# Patient Record
Sex: FEMALE | Race: WHITE | NOT HISPANIC OR LATINO | URBAN - METROPOLITAN AREA
[De-identification: names, ages, dates, MRNs, and addresses within clinical notes are randomized per-mention and may not be internally consistent; named-entity substitution may affect disease eponyms.]

---

## 2021-12-09 ENCOUNTER — PREPPED CHART (OUTPATIENT)
Dept: URBAN - METROPOLITAN AREA CLINIC 64 | Facility: CLINIC | Age: 66
End: 2021-12-09

## 2021-12-09 PROBLEM — H35.81 RETINAL EDEMA: Status: ACTIVE | Noted: 2021-12-09

## 2021-12-09 PROBLEM — H43.812 POSTERIOR VITREOUS DETACHMENT: Noted: 2021-12-09

## 2021-12-09 PROBLEM — H35.81 RETINAL EDEMA: Noted: 2021-12-09

## 2021-12-09 PROBLEM — H35.371 EPIRETINAL MEMBRANE: Noted: 2021-12-09 | Resolved: 2021-12-09

## 2021-12-09 PROBLEM — H35.81 RETINAL EDEMA: Status: RECURRENCE | Noted: 2021-12-09

## 2021-12-09 PROBLEM — H40.021 GLAUCOMA SUSPECT, HIGH RISK: Status: STABILIZING | Noted: 2021-12-09

## 2021-12-09 PROBLEM — H35.371 EPIRETINAL MEMBRANE: Noted: 2021-12-09

## 2021-12-09 PROBLEM — H35.81 RETINAL EDEMA: Status: RESOLVED | Noted: 2021-12-09 | Resolved: 2021-12-09

## 2021-12-09 PROBLEM — H35.81 RETINAL EDEMA: Status: RECURRENCE | Noted: 2021-12-09 | Resolved: 2021-12-09

## 2021-12-09 PROBLEM — H40.021 GLAUCOMA SUSPECT, HIGH RISK: Noted: 2021-12-09

## 2021-12-09 PROBLEM — H40.021 GLAUCOMA SUSPECT, HIGH RISK: Noted: 2021-12-09 | Resolved: 2021-12-09

## 2021-12-09 PROBLEM — H35.371 EPIRETINAL MEMBRANE: Status: STABILIZING | Noted: 2021-12-09

## 2021-12-09 PROBLEM — H43.812 POSTERIOR VITREOUS DETACHMENT: Noted: 2021-12-09 | Resolved: 2021-12-09

## 2021-12-09 PROBLEM — Z86.69 PERSONAL HISTORY DISORDERS OF NERVOUS SYSTEM AND SENSORY ORGANS: Noted: 2021-12-09

## 2021-12-09 PROBLEM — H35.81 RETINAL EDEMA: Status: DETERIORATING | Noted: 2021-12-09

## 2021-12-09 PROBLEM — H35.81 RETINAL EDEMA: Status: IMPROVING | Noted: 2021-12-09

## 2022-02-21 ENCOUNTER — HOSPITAL ENCOUNTER (INPATIENT)
Facility: HOSPITAL | Age: 67
LOS: 3 days | Discharge: HOME | DRG: 065 | End: 2022-02-24
Attending: EMERGENCY MEDICINE | Admitting: INTERNAL MEDICINE
Payer: MEDICARE

## 2022-02-21 DIAGNOSIS — I63.9 STROKE: ICD-10-CM

## 2022-02-21 DIAGNOSIS — I48.91 NEW ONSET A-FIB: ICD-10-CM

## 2022-02-21 DIAGNOSIS — I48.92 ATRIAL FIBRILLATION AND FLUTTER: ICD-10-CM

## 2022-02-21 DIAGNOSIS — I48.91 ATRIAL FIBRILLATION AND FLUTTER: ICD-10-CM

## 2022-02-21 DIAGNOSIS — I63.9 CEREBROVASCULAR ACCIDENT (CVA), UNSPECIFIED MECHANISM: Primary | ICD-10-CM

## 2022-02-21 PROBLEM — I63.311 THROMBOTIC STROKE INVOLVING RIGHT MIDDLE CEREBRAL ARTERY: Status: ACTIVE | Noted: 2022-02-21

## 2022-02-21 LAB
ALBUMIN SERPL BCP-MCNC: 3.7 G/DL (ref 3.5–5.2)
ALP SERPL-CCNC: 81 U/L (ref 55–135)
ALT SERPL W/O P-5'-P-CCNC: 24 U/L (ref 10–44)
ANION GAP SERPL CALC-SCNC: 10 MMOL/L (ref 8–16)
AST SERPL-CCNC: 21 U/L (ref 10–40)
BASOPHILS # BLD AUTO: 0.07 K/UL (ref 0–0.2)
BASOPHILS NFR BLD: 0.7 % (ref 0–1.9)
BILIRUB SERPL-MCNC: 0.5 MG/DL (ref 0.1–1)
BUN SERPL-MCNC: 21 MG/DL (ref 8–23)
CALCIUM SERPL-MCNC: 10.8 MG/DL (ref 8.7–10.5)
CHLORIDE SERPL-SCNC: 105 MMOL/L (ref 95–110)
CHOLEST SERPL-MCNC: 182 MG/DL (ref 120–199)
CHOLEST/HDLC SERPL: 4.7 {RATIO} (ref 2–5)
CO2 SERPL-SCNC: 23 MMOL/L (ref 23–29)
CREAT SERPL-MCNC: 0.9 MG/DL (ref 0.5–1.4)
DIFFERENTIAL METHOD: ABNORMAL
EOSINOPHIL # BLD AUTO: 0.3 K/UL (ref 0–0.5)
EOSINOPHIL NFR BLD: 3.4 % (ref 0–8)
ERYTHROCYTE [DISTWIDTH] IN BLOOD BY AUTOMATED COUNT: 12.7 % (ref 11.5–14.5)
EST. GFR  (AFRICAN AMERICAN): >60 ML/MIN/1.73 M^2
EST. GFR  (NON AFRICAN AMERICAN): >60 ML/MIN/1.73 M^2
GLUCOSE SERPL-MCNC: 111 MG/DL (ref 70–110)
HCT VFR BLD AUTO: 42.3 % (ref 37–48.5)
HDLC SERPL-MCNC: 39 MG/DL (ref 40–75)
HDLC SERPL: 21.4 % (ref 20–50)
HGB BLD-MCNC: 13.4 G/DL (ref 12–16)
IMM GRANULOCYTES # BLD AUTO: 0.04 K/UL (ref 0–0.04)
IMM GRANULOCYTES NFR BLD AUTO: 0.4 % (ref 0–0.5)
INR PPP: 0.9 (ref 0.8–1.2)
LDLC SERPL CALC-MCNC: 94.6 MG/DL (ref 63–159)
LYMPHOCYTES # BLD AUTO: 2.1 K/UL (ref 1–4.8)
LYMPHOCYTES NFR BLD: 21.6 % (ref 18–48)
MCH RBC QN AUTO: 29 PG (ref 27–31)
MCHC RBC AUTO-ENTMCNC: 31.7 G/DL (ref 32–36)
MCV RBC AUTO: 92 FL (ref 82–98)
MONOCYTES # BLD AUTO: 0.6 K/UL (ref 0.3–1)
MONOCYTES NFR BLD: 6.3 % (ref 4–15)
NEUTROPHILS # BLD AUTO: 6.5 K/UL (ref 1.8–7.7)
NEUTROPHILS NFR BLD: 67.6 % (ref 38–73)
NONHDLC SERPL-MCNC: 143 MG/DL
NRBC BLD-RTO: 0 /100 WBC
PLATELET # BLD AUTO: 264 K/UL (ref 150–450)
PMV BLD AUTO: 10.2 FL (ref 9.2–12.9)
POC PTINR: 1.2 (ref 0.9–1.2)
POC PTWBT: 13.9 SEC (ref 9.7–14.3)
POCT GLUCOSE: 117 MG/DL (ref 70–110)
POTASSIUM SERPL-SCNC: 4 MMOL/L (ref 3.5–5.1)
PROT SERPL-MCNC: 7.4 G/DL (ref 6–8.4)
PROTHROMBIN TIME: 9.9 SEC (ref 9–12.5)
RBC # BLD AUTO: 4.62 M/UL (ref 4–5.4)
SAMPLE: NORMAL
SARS-COV-2 RDRP RESP QL NAA+PROBE: NEGATIVE
SODIUM SERPL-SCNC: 138 MMOL/L (ref 136–145)
TRIGL SERPL-MCNC: 242 MG/DL (ref 30–150)
TSH SERPL DL<=0.005 MIU/L-ACNC: 1.72 UIU/ML (ref 0.4–4)
WBC # BLD AUTO: 9.57 K/UL (ref 3.9–12.7)

## 2022-02-21 PROCEDURE — 85025 COMPLETE CBC W/AUTO DIFF WBC: CPT | Performed by: EMERGENCY MEDICINE

## 2022-02-21 PROCEDURE — 25000003 PHARM REV CODE 250: Performed by: INTERNAL MEDICINE

## 2022-02-21 PROCEDURE — 80053 COMPREHEN METABOLIC PANEL: CPT | Performed by: EMERGENCY MEDICINE

## 2022-02-21 PROCEDURE — 93005 ELECTROCARDIOGRAM TRACING: CPT

## 2022-02-21 PROCEDURE — 93010 ELECTROCARDIOGRAM REPORT: CPT | Mod: ,,, | Performed by: SPECIALIST

## 2022-02-21 PROCEDURE — 63600175 PHARM REV CODE 636 W HCPCS: Performed by: INTERNAL MEDICINE

## 2022-02-21 PROCEDURE — 85610 PROTHROMBIN TIME: CPT | Performed by: EMERGENCY MEDICINE

## 2022-02-21 PROCEDURE — G0508 CRIT CARE TELEHEA CONSULT 60: HCPCS | Mod: 95,G0,, | Performed by: PSYCHIATRY & NEUROLOGY

## 2022-02-21 PROCEDURE — 63600175 PHARM REV CODE 636 W HCPCS: Performed by: NURSE PRACTITIONER

## 2022-02-21 PROCEDURE — 84443 ASSAY THYROID STIM HORMONE: CPT | Performed by: EMERGENCY MEDICINE

## 2022-02-21 PROCEDURE — 25000003 PHARM REV CODE 250: Performed by: NURSE PRACTITIONER

## 2022-02-21 PROCEDURE — 25000003 PHARM REV CODE 250: Performed by: EMERGENCY MEDICINE

## 2022-02-21 PROCEDURE — 93010 ELECTROCARDIOGRAM REPORT: CPT | Mod: 76,,, | Performed by: SPECIALIST

## 2022-02-21 PROCEDURE — 80061 LIPID PANEL: CPT | Performed by: EMERGENCY MEDICINE

## 2022-02-21 PROCEDURE — G0508 PR CRITICAL CARE TELEHLTH INITIAL CONSULT 60MIN: ICD-10-PCS | Mod: 95,G0,, | Performed by: PSYCHIATRY & NEUROLOGY

## 2022-02-21 PROCEDURE — 12000002 HC ACUTE/MED SURGE SEMI-PRIVATE ROOM

## 2022-02-21 PROCEDURE — 36415 COLL VENOUS BLD VENIPUNCTURE: CPT | Performed by: EMERGENCY MEDICINE

## 2022-02-21 PROCEDURE — 99291 CRITICAL CARE FIRST HOUR: CPT | Mod: 25

## 2022-02-21 PROCEDURE — U0002 COVID-19 LAB TEST NON-CDC: HCPCS | Performed by: INTERNAL MEDICINE

## 2022-02-21 PROCEDURE — 93010 EKG 12-LEAD: ICD-10-PCS | Mod: ,,, | Performed by: SPECIALIST

## 2022-02-21 PROCEDURE — 85610 PROTHROMBIN TIME: CPT

## 2022-02-21 PROCEDURE — 25500020 PHARM REV CODE 255: Performed by: EMERGENCY MEDICINE

## 2022-02-21 RX ORDER — CLOPIDOGREL BISULFATE 75 MG/1
75 TABLET ORAL DAILY
Status: DISCONTINUED | OUTPATIENT
Start: 2022-02-21 | End: 2022-02-22

## 2022-02-21 RX ORDER — ATORVASTATIN CALCIUM 40 MG/1
80 TABLET, FILM COATED ORAL DAILY
Status: DISCONTINUED | OUTPATIENT
Start: 2022-02-21 | End: 2022-02-24 | Stop reason: HOSPADM

## 2022-02-21 RX ORDER — ONDANSETRON 2 MG/ML
4 INJECTION INTRAMUSCULAR; INTRAVENOUS EVERY 6 HOURS PRN
Status: DISCONTINUED | OUTPATIENT
Start: 2022-02-21 | End: 2022-02-24 | Stop reason: HOSPADM

## 2022-02-21 RX ORDER — DIPHENHYDRAMINE HCL 25 MG
25 CAPSULE ORAL EVERY 6 HOURS PRN
Status: DISCONTINUED | OUTPATIENT
Start: 2022-02-21 | End: 2022-02-21

## 2022-02-21 RX ORDER — KETOROLAC TROMETHAMINE 5 MG/ML
1 SOLUTION OPHTHALMIC DAILY
COMMUNITY

## 2022-02-21 RX ORDER — ENOXAPARIN SODIUM 100 MG/ML
40 INJECTION SUBCUTANEOUS EVERY 24 HOURS
Status: DISCONTINUED | OUTPATIENT
Start: 2022-02-21 | End: 2022-02-22

## 2022-02-21 RX ORDER — METHYLPREDNISOLONE SOD SUCC 125 MG
125 VIAL (EA) INJECTION ONCE
Status: COMPLETED | OUTPATIENT
Start: 2022-02-21 | End: 2022-02-21

## 2022-02-21 RX ORDER — FAMOTIDINE 10 MG/ML
20 INJECTION INTRAVENOUS ONCE
Status: COMPLETED | OUTPATIENT
Start: 2022-02-21 | End: 2022-02-21

## 2022-02-21 RX ORDER — DILTIAZEM HYDROCHLORIDE 5 MG/ML
10 INJECTION INTRAVENOUS ONCE
Status: COMPLETED | OUTPATIENT
Start: 2022-02-21 | End: 2022-02-21

## 2022-02-21 RX ORDER — ASPIRIN 325 MG
325 TABLET ORAL ONCE
Status: COMPLETED | OUTPATIENT
Start: 2022-02-21 | End: 2022-02-21

## 2022-02-21 RX ORDER — AMLODIPINE BESYLATE 2.5 MG/1
2.5 TABLET ORAL DAILY
COMMUNITY
End: 2022-02-24

## 2022-02-21 RX ORDER — ASPIRIN 325 MG
325 TABLET ORAL DAILY
Status: DISCONTINUED | OUTPATIENT
Start: 2022-02-21 | End: 2022-02-21

## 2022-02-21 RX ORDER — LABETALOL HYDROCHLORIDE 5 MG/ML
10 INJECTION, SOLUTION INTRAVENOUS EVERY 4 HOURS PRN
Status: DISCONTINUED | OUTPATIENT
Start: 2022-02-21 | End: 2022-02-24 | Stop reason: HOSPADM

## 2022-02-21 RX ORDER — BRIMONIDINE TARTRATE AND TIMOLOL MALEATE 2; 5 MG/ML; MG/ML
1 SOLUTION OPHTHALMIC DAILY
COMMUNITY

## 2022-02-21 RX ORDER — ACETAMINOPHEN 325 MG/1
650 TABLET ORAL EVERY 4 HOURS PRN
Status: DISCONTINUED | OUTPATIENT
Start: 2022-02-21 | End: 2022-02-24 | Stop reason: HOSPADM

## 2022-02-21 RX ORDER — SODIUM CHLORIDE 0.9 % (FLUSH) 0.9 %
10 SYRINGE (ML) INJECTION
Status: DISCONTINUED | OUTPATIENT
Start: 2022-02-21 | End: 2022-02-24 | Stop reason: HOSPADM

## 2022-02-21 RX ORDER — ATORVASTATIN CALCIUM 40 MG/1
40 TABLET, FILM COATED ORAL DAILY
Status: DISCONTINUED | OUTPATIENT
Start: 2022-02-21 | End: 2022-02-21

## 2022-02-21 RX ORDER — ASPIRIN 81 MG/1
81 TABLET ORAL DAILY
Status: DISCONTINUED | OUTPATIENT
Start: 2022-02-22 | End: 2022-02-22

## 2022-02-21 RX ORDER — DIPHENHYDRAMINE HYDROCHLORIDE 50 MG/ML
25 INJECTION INTRAMUSCULAR; INTRAVENOUS ONCE
Status: COMPLETED | OUTPATIENT
Start: 2022-02-21 | End: 2022-02-21

## 2022-02-21 RX ORDER — CETIRIZINE HYDROCHLORIDE 10 MG/1
10 TABLET ORAL DAILY
COMMUNITY

## 2022-02-21 RX ADMIN — DILTIAZEM HYDROCHLORIDE 5 MG/HR: 5 INJECTION INTRAVENOUS at 10:02

## 2022-02-21 RX ADMIN — ATORVASTATIN CALCIUM 80 MG: 40 TABLET, FILM COATED ORAL at 12:02

## 2022-02-21 RX ADMIN — CLOPIDOGREL 75 MG: 75 TABLET, FILM COATED ORAL at 12:02

## 2022-02-21 RX ADMIN — ASPIRIN 325 MG ORAL TABLET 325 MG: 325 PILL ORAL at 12:02

## 2022-02-21 RX ADMIN — ENOXAPARIN SODIUM 40 MG: 40 INJECTION SUBCUTANEOUS at 06:02

## 2022-02-21 RX ADMIN — FAMOTIDINE 20 MG: 10 INJECTION, SOLUTION INTRAVENOUS at 10:02

## 2022-02-21 RX ADMIN — DIPHENHYDRAMINE HYDROCHLORIDE 25 MG: 50 INJECTION INTRAMUSCULAR; INTRAVENOUS at 10:02

## 2022-02-21 RX ADMIN — METHYLPREDNISOLONE SODIUM SUCCINATE 125 MG: 125 INJECTION, POWDER, FOR SOLUTION INTRAMUSCULAR; INTRAVENOUS at 10:02

## 2022-02-21 RX ADMIN — DIPHENHYDRAMINE HYDROCHLORIDE 25 MG: 25 CAPSULE ORAL at 06:02

## 2022-02-21 RX ADMIN — IOHEXOL 75 ML: 350 INJECTION, SOLUTION INTRAVENOUS at 09:02

## 2022-02-21 RX ADMIN — DILTIAZEM HYDROCHLORIDE 10 MG: 5 INJECTION INTRAVENOUS at 10:02

## 2022-02-21 RX ADMIN — ACETAMINOPHEN 650 MG: 325 TABLET ORAL at 06:02

## 2022-02-21 NOTE — HPI
65 yo female with L weakness and associated face droop and slurred speech.  No clear triggers.  Has not had in past.  Has not improved.  Has not been treated.

## 2022-02-21 NOTE — SUBJECTIVE & OBJECTIVE
"  Woke up with symptoms?: yes    Recent bleeding noted: no  Does the patient take any Blood Thinners? no  Medications: No relevant medications      Past Medical History: hypertension    Past Surgical History: none    Family History: no relevant history    Social History: no smoking, no drinking, no drugs    Allergies: No Known Allergies     Review of Systems   Constitutional: Negative for chills and fever.   HENT: Negative for nosebleeds and sore throat.    Eyes: Negative for photophobia, pain and redness.   Respiratory: Negative for cough and shortness of breath.    Cardiovascular: Negative for chest pain and palpitations.   Gastrointestinal: Negative for abdominal pain, blood in stool, diarrhea and nausea.   Endocrine: Negative for cold intolerance and heat intolerance.   Genitourinary: Negative for difficulty urinating and hematuria.   Musculoskeletal: Negative for arthralgias, back pain and neck pain.   Skin: Negative for color change and rash.   Neurological: Negative for light-headedness and headaches.   Hematological: Does not bruise/bleed easily.   Psychiatric/Behavioral: Negative for confusion and hallucinations.     Objective:   Vitals: Blood pressure (!) 198/106, pulse 90, temperature 97.8 °F (36.6 °C), temperature source Oral, resp. rate 18, height 5' 4" (1.626 m), weight 102.1 kg (225 lb).     CT READ: Yes  No hemmorhage. No mass effect. No early infarct signs.     Physical Exam  Vitals reviewed.   Constitutional:       Appearance: She is well-developed.   HENT:      Head: Normocephalic and atraumatic.      Right Ear: External ear normal.      Left Ear: External ear normal.   Eyes:      Conjunctiva/sclera: Conjunctivae normal.   Neck:      Trachea: No tracheal deviation.   Pulmonary:      Effort: Pulmonary effort is normal. No respiratory distress.   Abdominal:      General: There is no distension.   Musculoskeletal:         General: Normal range of motion.      Cervical back: Normal range of motion. "   Skin:     General: Skin is dry.   Neurological:      Mental Status: She is alert.   Psychiatric:         Behavior: Behavior normal.         Thought Content: Thought content normal.         Judgment: Judgment normal.

## 2022-02-21 NOTE — ED NOTES
0805- ED MD at bedside perfoming Assessment.Aaron Gross    0813- Transported to CT    0825- In CT; running POC Creatinine;     0832- MD in CT room reports put CTA on hold; POC creatinine not working properly.Aaron Gross    0856- Telemed Neurologist MD reports CT can be ran despite labs pending; CT notified; and informed of transport for stat scan.Aaron Gross

## 2022-02-21 NOTE — CONSULTS
[facility]  Neurology  Consult Note        PATIENT NAME: Tameka Hoffman  MRN: [id number]  CSN: [id number]      TODAY'S DATE: 02/21/2022  ADMIT DATE: 2/21/2022                            CONSULTING PROVIDER: Lane Brock MD  CONSULT REQUESTED BY: Bryan Marquez MD      Reason for consult: CVA       History obtained from chart review and the patient.    HPI: Tameka Hoffman is a 66 y.o. female with a history of hypertension, presents to the ER for left-sided weakness, gait imbalance which she noticed when she woke up this morning.    Patient states that she went to bed around 12:00 a.m. and she was normal at that time.  Woke up around 5:30 a.m. and noticed that she was weak on left upper and lower extremities and was not able to walk.  She presented to the ER for these symptoms.  In the ER, she had a tele stroke evaluation and had NIH stroke scale was 6. CT head showed early ischemic changes in the right MCA territory.  She was not a candidate for tPA as she was outside treatment window.  CT angiogram head and neck showed a filling defect in the distal right M2 branch.  Patient was not a candidate for intervention per tele stroke due to her distal occlusion.    I have seen the patient in the ER.  On exam, she had a left facial droop, she had a chronic right eye ptosis and mild left-sided weakness.  NIH stroke scale was 3 on my exam.    Patient is being admitted for further stroke workup and management.    She denies any history of stroke in the past, she is compliant with antihypertensive medications.  She is not on aspirin, Plavix or any anticoagulation at home.  She is not a smoker.      PREVIOUS MEDICAL HISTORY:  No past medical history on file.  PREVIOUS SURGICAL HISTORY:  No past surgical history on file.  FAMILY MEDICAL HISTORY:  No family history on file.  SOCIAL HISTORY:     ALLERGIES:  Review of patient's allergies indicates:  No Known Allergies  HOME MEDICATIONS:  Prior to  "Admission medications    Not on File     CURRENT SCHEDULED MEDICATIONS:   [START ON 2/22/2022] aspirin  81 mg Oral Daily    atorvastatin  80 mg Oral Daily    clopidogreL  75 mg Oral Daily    enoxaparin  40 mg Subcutaneous Daily     CURRENT INFUSIONS:    CURRENT PRN MEDICATIONS:  acetaminophen, labetalol, ondansetron, sodium chloride 0.9%    REVIEW OF SYSTEMS:  Please refer to the HPI for all pertinent positive and negative findings. A comprehensive review of all other systems was negative.       PHYSICAL EXAM:  Patient Vitals for the past 24 hrs:   BP Temp Temp src Pulse Resp SpO2 Height Weight   02/21/22 1015 (!) 147/78 -- -- 84 18 96 % -- --   02/21/22 1000 (!) 148/70 -- -- 75 19 95 % -- --   02/21/22 0945 (!) 169/87 -- -- 83 18 96 % -- --   02/21/22 0930 (!) 167/79 -- -- 80 18 97 % -- --   02/21/22 0915 (!) 173/83 -- -- 86 18 97 % -- --   02/21/22 0900 (!) 171/82 -- -- 81 17 97 % -- --   02/21/22 0845 (!) 162/79 -- -- 77 19 95 % -- --   02/21/22 0830 (!) 172/64 -- -- 89 19 98 % -- --   02/21/22 0815 (!) 162/78 -- -- 86 18 97 % -- --   02/21/22 0803 (!) 198/106 97.8 °F (36.6 °C) Oral 90 18 -- 5' 4" (1.626 m) 102.1 kg (225 lb)       GENERAL APPEARANCE: Alert, well-developed, well-nourished female in no acute distress.  HEENT: Normocephalic and atraumatic. PERRL. Oropharynx unremarkable.  PULM: Normal respiratory effort. No accessory muscle use.  CV: RRR.  ABDOMEN: Soft, nontender.  EXTREMITIES: No obvious signs of vascular compromise. Pulses present. No cyanosis, clubbing or edema.  SKIN: Clear; no rashes, lesions or skin breaks in exposed areas.    NEURO:  MENTAL STATUS: Patient awake and oriented to time, place, and person, recent/remote memory normal, attention span/concentration normal, speech fluent without paraphasic errors, good comprehension with appropriate thought content and fund of knowledge appropriate for patient's level of education.  Affect euthymic.    CRANIAL NERVES:  CN I: Not tested.  CN II: " Fundoscopic exam deferred.  CN III, IV, VI: Pupils equal, round and reactive to light.  Extraocular movements full and intact.  Right ptosis (chronic)  CN V: Facial sensation normal.  CN VII: Facial asymmetry noted for left facial droop.  CN VIII: Hearing grossly normal and equal bilaterally.  No skew deviation or pathologic nystagmus.  CN IX, X: Palate elevates symmetrically. Speech/articulation is clear without dysarthria.  CN XI: Shoulder shrug and chin rotation equal with good strength.  CN XII: Tongue protrusion midline.    MOTOR:  Bulk normal. Tone normal and symmetric throughout.  Abnormal movements absent.  Tremor: none present.  Strength 5/5 in right upper and lower extremity, 4/5 in left upper and lower extremity..    REFLEXES:  DTRs 2+ throughout.  Plantar response downgoing bilaterally.  SENSATION:grossly intact throughout.  COORDINATION: normal finger-to-nose.  STATION: not tested.  GAIT: not tested.      NIHSS:  1a      Level of Consciousness (alert, drowsy, etc.):   0=alert; keenly responsive  1b.     Level of Consciousness Questions (month, age): 0= able to answer both questions  1c.     Level of Consciousness Commands (open, close eyes, make fist, let go):  0=Answers both tasks correctly  2.      Best Gaze (eyes open - patient follows examiner's finger or face):      0=normal  3.      Visual (introduce visual stimulus/threat to patient's visual field quadrants):  0=No visual loss  4.      Facial Palsy (show teeth, raise eyebrows and squeeze eyes shut):        2=Partial paralysis (total or near total paralysis of the lower face)  5a.     Motor Arm - Left (elevate extremity 90 degrees and score drift/movement):       1=Drift, limb holds 90 (or 45) degrees but drifts down before full 10 seconds: does not hit bed  5b.     Motor Arm - Right (elevate extremity 90 degrees and score drift/movement):      0=No drift, limb holds 90 (or 45) degrees for full 10 seconds  6a.     Motor Leg - Left (elevate  extremity 30 degrees and score drift/movement):       0=No drift, limb holds 90 (or 45) degrees for full 10 seconds  6b      Motor Leg - Right (elevate extremity 30 degrees and score drift/movement):      0=No drift, limb holds 90 (or 45) degrees for full 10 seconds  7.      Limb Ataxia (finger-nose, heel down shin):      0=Absent  8.      Sensory (pin prick to face, arm, trunk, and leg - compare side to side):        0=Normal; no sensory loss  9.      Best Language (name items, describe a picture and read sentences):      0=No aphasia, normal  10.     Dysarthria (evaluate speech clarity by patient repeating listed words): 0=Normal  11.     Extinction and Inattention (use prior testing to identify neglect or double simultaneous stimuli testing):      0=No abnormality          NIH Stroke Scale Total:         3      Labs:  Recent Labs   Lab 02/21/22  0853      K 4.0      CO2 23   BUN 21   CREATININE 0.9   *   CALCIUM 10.8*     Recent Labs   Lab 02/21/22  0853   WBC 9.57   HGB 13.4   HCT 42.3        Recent Labs   Lab 02/21/22  0853   ALBUMIN 3.7   PROT 7.4   BILITOT 0.5   ALKPHOS 81   ALT 24   AST 21     Lab Results   Component Value Date    INR 0.9 02/21/2022     Lab Results   Component Value Date    TRIG 242 (H) 02/21/2022    HDL 39 (L) 02/21/2022    CHOLHDL 21.4 02/21/2022     No results found for: HGBA1C  No results found for: PROTEINCSF, GLUCCSF, WBCCSF    Imaging:  I have reviewed and interpreted the pertinent imaging and lab results.      US Carotid Bilateral  Narrative: EXAMINATION:  US CAROTID BILATERAL    CLINICAL HISTORY:  stroke;    TECHNIQUE:  Grayscale and color Doppler ultrasound examination of the carotid and vertebral artery systems bilaterally.  Stenosis estimates are per the NASCET measurement criteria.    COMPARISON:  None.    FINDINGS:  Right:    Internal Carotid Artery (ICA) peak systolic velocity 114 cm/sec    ICA/CCA peak systolic velocity ratio: 1.5    Plaque  formation: Homogeneous    Vertebral artery: Antegrade flow and normal waveform.    Left:    Internal Carotid Artery (ICA)  peak systolic velocity 85 cm/sec    ICA/CCA peak systolic velocity ratio: 1.1    Plaque formation: Homogeneous    Vertebral artery: Antegrade flow and normal waveform.  Impression: No evidence of a hemodynamically significant carotid bifurcation stenosis.    Electronically signed by: Leonard Young  Date:    02/21/2022  Time:    11:15  CTA Head and Neck (xpd)  Narrative: EXAMINATION:  CTA HEAD AND NECK (XPD)    CLINICAL HISTORY:  Neuro deficit, acute, stroke suspected;    TECHNIQUE:  CT angiogram was performed from the level of the ritu to the top of the head following the IV administration of 75mL of Omnipaque 350.   Sagittal and coronal reconstructions and maximum intensity projection reconstructions were performed. Arterial stenosis percentages are based on NASCET measurement criteria.    COMPARISON:  Same-day head CT    FINDINGS:  Subtle loss of gray-white differentiation in the posterior right frontal lobe.  Normal size of the ventricular system.  Mild mucosal thickening in the ethmoid sinuses.  Mild mucosal thickening right maxillary sinus.  No mass or abnormal enhancement along the aero digestive tract.  Glandular tissue in the neck unremarkable.  No cervical adenopathy.  Some motion limits assessment of fine detail in the lung apices.  There is a calcified granuloma in the left upper lobe and at the right lung apex there is a 3 mm nodule series 2, image 89.  Mild multilevel degenerative changes in the spine.    There is a bovine arch configuration.  Right subclavian artery is patent.  Left subclavian artery demonstrates a short segment of approximately 50% stenosis near the thoracic inlet likely due to extrinsic muscular compression of (axial series 2, image 142.  Right common carotid artery is patent.  There is plaque along the right carotid bulb with less than 50% stenosis.  Right  ICA is patent.  Left common carotid artery is patent.  There is plaque along the left carotid bulb with less than 50% stenosis.  Left ICA is patent.  There is a filling defect involving a distal right M2 branch extending over a 9 mm distance near the sylvian fissure.  This can be seen on axial series 2, image 416-431 and coronal series 601, image 42.  There is some reconstitution of flow distal to this segment.  Left MCA and bilateral ACAs are patent.  There is an anterior communicating artery.    Bilateral vertebral arteries are patent.  Patient is Co vertebral artery dominant.  The basilar artery and bilateral PCAs are patent.  There are bilateral posterior communicating arteries.  Impression: 1. Filling defect in a distal right M2 branch correlating to an area of infarct/ischemia in the right frontal lobe.  2. Right pulmonary nodule.  In a low risk patient, no further follow-up is recommended.  In a high-risk patient, 12 month follow-up CT could be considered.  Findings were discussed with Dr. Cook by telephone at 09:50 hours.    Electronically signed by: Leonard Young  Date:    02/21/2022  Time:    10:06  CT Head Without Contrast  Narrative: EXAMINATION:  CT HEAD WITHOUT CONTRAST    CLINICAL HISTORY:  Neuro deficit, acute, stroke suspected;    TECHNIQUE:  Low dose axial images were obtained through the head.  Coronal and sagittal reformations were also performed. Contrast was not administered.    COMPARISON:  None.    FINDINGS:  There are areas of subtle loss of gray-white differentiation in the right frontal region suspicious for an acute or early subacute infarct.  There is no intracranial hemorrhage.  No associated mass effect.  The ventricles are nondilated.  Impression: Findings suspicious for an early right MCA territory ischemic infarct.  No intracranial hemorrhage.  Consider further evaluation with CTA and/or MRI.    COMMUNICATION  This critical result was discovered/received at 08:30.  The critical  information above was relayed directly by me by telephone to Dr. Artur Cook on 02/21/2022 at 08:37.    Electronically signed by: Moe Huertas MD  Date:    02/21/2022  Time:    08:42         ASSESSMENT & PLAN:    Acute ischemic right MCA stroke  Hypertension    Etiology of right MCA stroke is cryptogenic.    Workup  · CTH:  Areas of loss of gray-white differentiation in the right frontal region suspicious for acute to early subacute ischemic infarct.  No intracranial hemorrhage  · CTA head and neck:  Distal right M2 filling defect which could be an occlusion.  · MRI brain: Acute ischemic R MCA stroke in the right frontal and temporal lobe  · ECHO:  pending   · LDL: pending   · HbA1c: pending         Plan  · Admitted for further stroke workup  · Start with Aspirin 81 mg, Plavix 75 mg and Lipitor 80mg(goal LDL less than 70).   · Permissive BP to 220 systolic for 48 hrs from symptom onset and after that normalize BP  · Anticoagulation not indicated at this time as stroke etiology is cryptogenic.  · If TTE is negative, patient will need a BOB to rule out intracardiac thrombus  · PT OT  · Speech therapy  · DVT prophylaxis with chemo/SCD prophylaxis  · Extensively discussed lifestyle modifications as prophylactic measures for stroke prevention including, adequate blood pressure management, healthy diet and regular exercise.      Thank you kindly for including us in the care of this patient. Please do not hesitate to contact us with any questions.      Critical Care:  52 minutes of critical care time has been spent evaluating with the patient. Time includes chart review not limited to diagnostic imaging, labs, and vitals, patient assessment, discussion with family and nursing, current order evaluations, and new order entries.       Lane Brock MD  Neurology/vascular Neurology  Date of Service: 02/21/2022  1:17 PM        Please note: This note was transcribed using voice recognition software. Because of this  technology there are often uinintended grammatical, spelling, and other transcription errors. Please disregard these errors.

## 2022-02-21 NOTE — ASSESSMENT & PLAN NOTE
67 yo female with L weakness.  Can't completely rule out that there is no LVO, get CTA.    No tPA since she woke up w sx.  Will determine other needs after CTA.

## 2022-02-21 NOTE — PHARMACY MED REC
"Admission Medication History     The home medication history was taken by Indiana Wolf CPhT.    Medication history obtained from patient     You may go to "Admission" then "Reconcile Home Medications" tabs to review and/or act upon these items.      The home medication list has been updated by the Pharmacy department.    Please read ALL comments highlighted in yellow.    Please address this information as you see fit.     Feel free to contact us if you have any questions or require assistance.    Indiana Wolf CPhT.  [phone]                  .          "

## 2022-02-21 NOTE — Clinical Note
The procedural consent was signed. A history and physical note was completed in the chart.  pt is AOx2 cc tremors x2 weeks and worsening expressive aphasia.  pmh TIA, seizures, DVT, CKD, HTN, HLD, Diabetes, bipolar/depression.  states that she fell this morning with a period of LOC, with head strike.  pt states on blood thinners.  strengths upper 3/3 lowers 2/1.  PERRLA, face is symmetrical, no tongue deviation.  refer to flow sheet for vitals, safety in place, pt is resting quietly, will continue to monitor.

## 2022-02-21 NOTE — H&P
History and Physical for Admission  [facility]      Tameka Hoffman (MRN: [id number])  Admitting Service: Hospital Medicine  Date of Admission: 2/21/2022   Primary Care Provider: Primary Doctor No    ?  Chief complaint: left sided weakness, dysarthria    ?  History of Present Illness:     66-year-old female with history of hypertension presents with 1 day of left-sided weakness, imbalance and dysarthria found to have an acute CVA.    The patient went to bed without symptoms around midnight.  She awoke in the middle the morning around 530. At this point she recognized that she was having difficulty ambulating due to weakness on the left and difficulty with balance.  When attempting to speak she noticed she had difficulty saying words even though she did have a problem finding words.    At the time of the interview weakness had significantly improved.  Dysarthria had also significantly improved such that she was able to fully communicate.    ?  ?  Review of Systems:   Constitutional: ; no fevers/chills, night sweats, weight changes, fatigue, recent illnesses, headaches  Eyes: ; denies vision changes including recent decrease in vision, double vision or blurriness  Ears, Nose, Mouth, Throat: denies hearing abnormalities, tinnitus, rhinorrhea, sinus pain/congestion, dysphagia, sore throat, hoarseness, neck pain  Cardiovascular: denies chest pain, palpitations, syncope, HOBSON, PND, orthopnea  Respiratory: ; denies dyspnea, cough, wheezing  GI: denies abdominal pain, nausea, vomiting, diarrhea, constipation, melena,   :  denies dysuria, hematuria, polyuria, incontinence  MSK: denies joint pain, weakness, myalgias  Integument:  denies rashes, lesions, skin changes  Neuro:  See HPI  Psych: denies history of anxiety/depression  Endocrine: ; denies polyuria/polydipsia, polyphagia, heat/cold intolerance,  Hematologic/lymphatic:  denies easy bleeding/bruising, LAD    ?  Medical History    PAST MEDICAL HISTORY:  has no  "past medical history on file.  Hypertension  PAST SURGICAL HISTORY:  has no past surgical history on file.  ?  Eye surgery  PAST SOCIAL HISTORY:    Does not smoke drink or do drugs  FAMILY HISTORY: family history is not on file.  Father with cardiac disease  ?  CURRENT OUTPATIENT MEDS  Allergies:   Review of patient's allergies indicates:   Allergen Reactions    Poison ivy extract      ?  [unfilled]    ?  PHYSICAL EXAM  Vitals: BP (!) 144/81   Pulse 82   Temp 98.4 °F (36.9 °C)   Resp 18   Ht 5' 4" (1.626 m)   Wt 102.1 kg (225 lb)   SpO2 97%   BMI 38.62 kg/m²  Body mass index is 38.62 kg/m².    General: NAD, AO3  HEENT: PERRL, EOMI.   Cardiovascular: RRR  Respiratory: lungs CTAB  GI: abdomen S/NT/ND, +BS  Extremities: no edema  MSK: moves upper extremities.   Neuro/Psych: A&OX3.  Prior right eye surgery.  Patient reports that her right eye droop is at baseline and that her pupils are known to be of different sizes.  4/5 strength on the left.  Full on the right.      EKG and radiographs from the past 24h: reviewed and personally interpreted if available.      LABS    Recent Labs     02/21/22  0853   WBC 9.57   HGB 13.4        ?  Recent Labs     02/21/22  0853      K 4.0   CO2 23   BUN 21   CREATININE 0.9     ?  Recent Labs     02/21/22  0853   BILITOT 0.5   AST 21   ALT 24   ALKPHOS 81   PROT 7.4     ?  Recent Labs     02/21/22  0853   INR 0.9     ?    ASSESSMENT & PLAN    66-year-old female with history of hypertension presents with 1 day of left-sided weakness, imbalance and dysarthria found to have an acute CVA.    1. Acute CVA of the right MCA  -distal right M2 branch occlusion suggested by CTA  -deemed not a candidate for intervention  -will require BOB if TTE is unremarkable per Neurology  -aspirin, Plavix, statin  -A1c:  -LDL:  -MRI, echo, tele, carotids ordered    2. Hypertension  -holding home Norvasc for permissive hypertension.    DVT prophylaxis:  Lovenox    Highly complex medical " decision making, case discussed with ER physician, EKG reviewed.        Code Status/Dispo: Full Code.   ?  Bryan Marquez    Date: 2/21/2022  Time: 5:58 PM

## 2022-02-21 NOTE — ED PROVIDER NOTES
"Thirteen Encounter Date: 2/21/2022    SCRIBE #1 NOTE: ILindy, maria luisa scribing for, and in the presence of, Artur Cook MD.       History     Chief Complaint   Patient presents with    Extremity Weakness     Left sided weakness  / left facial droop  / started at 10 pm yesterday      Time seen by provider: 8:04 AM on 02/21/2022    Tameka Hoffman is a 66 y.o. female who presents to the ED via EMS for possible stroke with an onset of left sided facial droop, left sided weakness, and aphasia. Patient was reportedly fine last night after eating dinner. She woke up to use the bathroom around midnight, at which time, she noticed she was stumbling and felt off balance which she attributed to possibly eating something that did not sit well with her. She states "something must have happened sometime after midnight" because she woke up this morning with left-sided facial droop and was finding it difficult to speak with her  which prompted call to EMS. Per EMS personnel, left-sided facial droop, left arm droop, left leg weakness, and aphasia is noted. Left pupil is more dilated than right. However, patient notes previous right cataract surgery. BP is elevated at 198/100. She does have a Hx of HTN. The patient denies any other symptoms at this time. Last known normal is 10:00PM yesterday. PMHx of HTN. No pertinent PSHx.    The history is provided by the patient and the EMS personnel.     Review of patient's allergies indicates:   Allergen Reactions    Poison ivy extract      No past medical history on file.  No past surgical history on file.  No family history on file.     Review of Systems   Constitutional: Negative for fever.   HENT: Negative for sore throat.    Respiratory: Negative for shortness of breath.    Cardiovascular: Negative for chest pain.   Gastrointestinal: Negative for nausea.   Genitourinary: Negative for dysuria.   Musculoskeletal: Positive for gait problem. Negative for back pain.   Skin: " Negative for rash.   Neurological: Positive for facial asymmetry, speech difficulty and weakness (left-sided).   Hematological: Does not bruise/bleed easily.       Physical Exam     Initial Vitals   BP Pulse Resp Temp SpO2   02/21/22 0803 02/21/22 0803 02/21/22 0803 02/21/22 0803 02/21/22 0815   (!) 198/106 90 18 97.8 °F (36.6 °C) 97 %      MAP       --                Physical Exam    Nursing note and vitals reviewed.  Constitutional: She appears well-developed and well-nourished.  Non-toxic appearance. No distress.   HENT:   Head: Normocephalic and atraumatic.   Eyes: EOM are normal.   No visual field deficits. Right pupil 3 mm. Left pupil 4 mm.   Neck: Neck supple. No JVD present.   Normal range of motion.  Cardiovascular: Normal rate, regular rhythm, normal heart sounds and intact distal pulses. Exam reveals no gallop and no friction rub.    No murmur heard.  Pulmonary/Chest: Breath sounds normal. She has no wheezes. She has no rhonchi. She has no rales.   Abdominal: Abdomen is soft. Bowel sounds are normal. There is no abdominal tenderness. There is no rebound and no guarding.   Musculoskeletal:         General: Normal range of motion.      Cervical back: Normal range of motion and neck supple. No rigidity.     Neurological: She is alert and oriented to person, place, and time. She has normal strength and normal reflexes. No sensory deficit. She exhibits normal muscle tone. Coordination normal. GCS eye subscore is 4. GCS verbal subscore is 5. GCS motor subscore is 6.   Left-sided facial droop which corrects with smiling and spares the forehead. 5/5 strength to bilateral upper and lower extremities. No dysarthria.    Skin: Skin is warm and dry.   Psychiatric: She has a normal mood and affect. Her speech is normal and behavior is normal. She is not actively hallucinating.         ED Course   Procedures  Labs Reviewed   CBC W/ AUTO DIFFERENTIAL - Abnormal; Notable for the following components:       Result Value     MCHC 31.7 (*)     All other components within normal limits   COMPREHENSIVE METABOLIC PANEL - Abnormal; Notable for the following components:    Glucose 111 (*)     Calcium 10.8 (*)     All other components within normal limits   LIPID PANEL - Abnormal; Notable for the following components:    Triglycerides 242 (*)     HDL 39 (*)     All other components within normal limits   POCT GLUCOSE - Abnormal; Notable for the following components:    POCT Glucose 117 (*)     All other components within normal limits   PROTIME-INR   TSH   SARS-COV-2 RNA AMPLIFICATION, QUAL   POCT GLUCOSE, HAND-HELD DEVICE   SARS-COV-2 RDRP GENE   ISTAT PROCEDURE   POCT PROTIME-INR   POCT CREATININE     EKG Readings: (Independently Interpreted)   Normal sinus rhythm at ventricular rate of 81 bpm. T wave flattening in AVL. Nonspecific ST changes. No STEMI.       Imaging Results          US Carotid Bilateral (Final result)  Result time 02/21/22 11:15:49    Final result by Leonard Young MD (02/21/22 11:15:49)                 Impression:      No evidence of a hemodynamically significant carotid bifurcation stenosis.      Electronically signed by: Leonard Young  Date:    02/21/2022  Time:    11:15             Narrative:    EXAMINATION:  US CAROTID BILATERAL    CLINICAL HISTORY:  stroke;    TECHNIQUE:  Grayscale and color Doppler ultrasound examination of the carotid and vertebral artery systems bilaterally.  Stenosis estimates are per the NASCET measurement criteria.    COMPARISON:  None.    FINDINGS:  Right:    Internal Carotid Artery (ICA) peak systolic velocity 114 cm/sec    ICA/CCA peak systolic velocity ratio: 1.5    Plaque formation: Homogeneous    Vertebral artery: Antegrade flow and normal waveform.    Left:    Internal Carotid Artery (ICA)  peak systolic velocity 85 cm/sec    ICA/CCA peak systolic velocity ratio: 1.1    Plaque formation: Homogeneous    Vertebral artery: Antegrade flow and normal waveform.                                MRI BRAIN WITHOUT CONTRAST (Final result)  Result time 02/21/22 13:29:37    Final result by Tereso Liriano IV, MD (02/21/22 13:29:37)                 Impression:      Diffusion positivity along the sylvian fissure on the right suggestive of a recent or acute region of ischemia/infarction      Electronically signed by: Tereso Liriano MD  Date:    02/21/2022  Time:    13:29             Narrative:    EXAMINATION:  MRI BRAIN WITHOUT CONTRAST    CLINICAL HISTORY:  Stroke;    TECHNIQUE:  Multiplanar multisequence MR imaging of the brain was performed without contrast.    COMPARISON:  None.    FINDINGS:  A T1 hypointensity is noted within the right cerebellar hemisphere suggestive a region of gliosis.  The ventricles and sulci appear age-appropriate.  Some mild periventricular white matter changes are noted suggestive of chronic microvascular ischemic change.  No gradient susceptibility is noted to suggest the presence of acute blood products.    Some diffusion positivity is noted along the sylvian fissure on the right along the cortex anteriorly in particular suggestive a region of infarction.  Mild increased FLAIR signal is noted in this region.  The region of diffusion positivity axially appears to span approximately 3 x 3 cm                               CTA Head and Neck (xpd) (Final result)  Result time 02/21/22 10:06:23    Final result by Leonard Young MD (02/21/22 10:06:23)                 Impression:      1. Filling defect in a distal right M2 branch correlating to an area of infarct/ischemia in the right frontal lobe.  2. Right pulmonary nodule.  In a low risk patient, no further follow-up is recommended.  In a high-risk patient, 12 month follow-up CT could be considered.  Findings were discussed with Dr. Cook by telephone at 09:50 hours.      Electronically signed by: Leonard Young  Date:    02/21/2022  Time:    10:06             Narrative:    EXAMINATION:  CTA HEAD AND NECK (XPD)    CLINICAL  HISTORY:  Neuro deficit, acute, stroke suspected;    TECHNIQUE:  CT angiogram was performed from the level of the ritu to the top of the head following the IV administration of 75mL of Omnipaque 350.   Sagittal and coronal reconstructions and maximum intensity projection reconstructions were performed. Arterial stenosis percentages are based on NASCET measurement criteria.    COMPARISON:  Same-day head CT    FINDINGS:  Subtle loss of gray-white differentiation in the posterior right frontal lobe.  Normal size of the ventricular system.  Mild mucosal thickening in the ethmoid sinuses.  Mild mucosal thickening right maxillary sinus.  No mass or abnormal enhancement along the aero digestive tract.  Glandular tissue in the neck unremarkable.  No cervical adenopathy.  Some motion limits assessment of fine detail in the lung apices.  There is a calcified granuloma in the left upper lobe and at the right lung apex there is a 3 mm nodule series 2, image 89.  Mild multilevel degenerative changes in the spine.    There is a bovine arch configuration.  Right subclavian artery is patent.  Left subclavian artery demonstrates a short segment of approximately 50% stenosis near the thoracic inlet likely due to extrinsic muscular compression of (axial series 2, image 142.  Right common carotid artery is patent.  There is plaque along the right carotid bulb with less than 50% stenosis.  Right ICA is patent.  Left common carotid artery is patent.  There is plaque along the left carotid bulb with less than 50% stenosis.  Left ICA is patent.  There is a filling defect involving a distal right M2 branch extending over a 9 mm distance near the sylvian fissure.  This can be seen on axial series 2, image 416-431 and coronal series 601, image 42.  There is some reconstitution of flow distal to this segment.  Left MCA and bilateral ACAs are patent.  There is an anterior communicating artery.    Bilateral vertebral arteries are patent.   Patient is Co vertebral artery dominant.  The basilar artery and bilateral PCAs are patent.  There are bilateral posterior communicating arteries.                               CT Head Without Contrast (Final result)  Result time 02/21/22 08:42:50    Final result by Moe Huertas MD (02/21/22 08:42:50)                 Impression:      Findings suspicious for an early right MCA territory ischemic infarct.  No intracranial hemorrhage.  Consider further evaluation with CTA and/or MRI.    COMMUNICATION  This critical result was discovered/received at 08:30.  The critical information above was relayed directly by me by telephone to Dr. Artur Cook on 02/21/2022 at 08:37.      Electronically signed by: Moe Huertas MD  Date:    02/21/2022  Time:    08:42             Narrative:    EXAMINATION:  CT HEAD WITHOUT CONTRAST    CLINICAL HISTORY:  Neuro deficit, acute, stroke suspected;    TECHNIQUE:  Low dose axial images were obtained through the head.  Coronal and sagittal reformations were also performed. Contrast was not administered.    COMPARISON:  None.    FINDINGS:  There are areas of subtle loss of gray-white differentiation in the right frontal region suspicious for an acute or early subacute infarct.  There is no intracranial hemorrhage.  No associated mass effect.  The ventricles are nondilated.                                 Medications   atorvastatin tablet 80 mg (80 mg Oral Given 2/21/22 1254)   sodium chloride 0.9% flush 10 mL (has no administration in time range)   labetaloL injection 10 mg (has no administration in time range)   enoxaparin injection 40 mg (has no administration in time range)   aspirin EC tablet 81 mg (has no administration in time range)   clopidogreL tablet 75 mg (75 mg Oral Given 2/21/22 1254)   acetaminophen tablet 650 mg (has no administration in time range)   ondansetron injection 4 mg (has no administration in time range)   iohexoL (OMNIPAQUE 350) injection 75 mL (75 mLs  Intravenous Given 2/21/22 0908)   aspirin tablet 325 mg (325 mg Oral Given 2/21/22 1254)     Medical Decision Making:   History:   Old Medical Records: I decided to obtain old medical records.  Initial Assessment:   66-year-old woman presents emergency department for stroke-like symptoms consistent with MCA infarct.  Symptoms are improving, outside the window for tPA.  Distal M2 -M3 occlusion -Not amiable to thrombectomy after discussion with IR/Vascular neurology. Will admit here for stroke randolph and treatment.  I discussed with Neurology in hospital medicine obese admit the patient and treat further.  .  Independently Interpreted Test(s):   I have ordered and independently interpreted EKG Reading(s) - see prior notes  Clinical Tests:   Lab Tests: Ordered and Reviewed  Radiological Study: Reviewed and Ordered  Medical Tests: Ordered and Reviewed  ED Management:  Critical Care Time MDM    The high probability of sudden, clinically significant deterioration in the patient's condition required the highest level of my preparedness to intervene urgently.    The services I provided to this patient were to treat and/or prevent clinically significant deterioration that could result in permanent disability, chronic pain and/or death.     Services included the following: chart data review, reviewing nursing notes and/or old charts, documentation time, consultant collaboration regarding findings and treatment options, medication orders and management, direct patient care, vital sign assessments and ordering, interpreting and reviewing diagnostic studies/lab tests.    Aggregate critical care time was 50 minutes, which includes only time during which I was engaged in work directly related to the patient's care, as described above, whether at the bedside or elsewhere in the Emergency Department.     Artur Cook M.D.         Last known normal: 22:00 on 2/20/22  Provider contact time: 08:04  Head CT Read by MD: 08:27 Negative  head CT.  Neurology consult ordered by ED MD: 08:03    VAN positive? No    Weakness: No    Visual changes: No  Aphasia: No  Neglect: No    TPa administered: No.          Scribe Attestation:   Scribe #1: I performed the above scribed service and the documentation accurately describes the services I performed. I attest to the accuracy of the note.    Attending Attestation:             Attending ED Notes:   8:40 AM  Discussed CT findings with radiology. Findings suspicious of right MCA infarct.      ED Course as of 02/21/22 1439   Mon Feb 21, 2022   0843 Discussed with Radiology who believe this patient has subtle findings concerning for MCA infarct.     [AS]   0844 Check with charge nurse on status of teleneuro consult.  She is on the phone calling in teleneuro consult at this time. [AS]   0845 Remote video machine currently being used in another room with a psychiatric consult, nurse Dawit is bringing cart from ICU to perform neuro consult.  [AS]      ED Course User Index  [AS] Artur Cook MD           I, Joey Calero, personally performed the services described in this documentation. All medical record entries made by the scribe were at my direction and in my presence.  I have reviewed the chart and agree that the record reflects my personal performance and is accurate and complete. Artur Cook MD.  2:39 PM 02/21/2022       DISCLAIMER: This note was prepared with [employer] voice recognition transcription software. Garbled syntax, mangled pronouns, and other bizarre constructions may be attributed to that software system.    Clinical Impression:   Final diagnoses:  [I63.9] Stroke  [I63.9] Cerebrovascular accident (CVA), unspecified mechanism (Primary)          ED Disposition Condition    Admit               Artur Cook MD  02/21/22 0902

## 2022-02-21 NOTE — CONSULTS
[facility]  Vascular Neurology  Comprehensive Stroke Center  TeleVascular Neurology Acute Consultation Note      Consults    Consulting Provider: WILLIE BRASHER  Current Providers  No providers found    Patient Location:  [facility] EMERGENCY DEPARTMENT Emergency Department  Spoke hospital nurse at bedside with patient assisting consultant.     Patient information was obtained from patient.         Assessment/Plan:       Diagnoses:   * Thrombotic stroke involving right middle cerebral artery  67 yo female with L weakness.  Can't completely rule out that there is no LVO, get CTA.    No tPA since she woke up w sx.  Will determine other needs after CTA.          STROKE DOCUMENTATION     Acute Stroke Times:   Acute Stroke Times   Last Known Normal Time: 1230  Stroke Team Called Time: 0846  Stroke Team Arrival Time: 0847  CT Interpretation Time: 0848    NIH Scale:  1a. Level of Consciousness: 0-->Alert, keenly responsive  1b. LOC Questions: 0-->Answers both questions correctly  1c. LOC Commands: 0-->Performs both tasks correctly  2. Best Gaze: 1-->Partial gaze palsy, gaze is abnormal in one or both eyes, but forced deviation or total gaze paresis is not present  3. Visual: 0-->No visual loss  4. Facial Palsy: 2-->Partial paralysis (total or near-total paralysis of lower face)  5a. Motor Arm, Left: 1-->Drift, limb holds 90 (or 45) degrees, but drifts down before full 10 seconds, does not hit bed or other support  5b. Motor Arm, Right: 0-->No drift, limb holds 90 (or 45) degrees for full 10 secs  6a. Motor Leg, Left: 1-->Drift, leg falls by the end of the 5-sec period but does not hit bed  6b. Motor Leg, Right: 0-->No drift, leg holds 30 degree position for full 5 secs  7. Limb Ataxia: 0-->Absent  8. Sensory: 0-->Normal, no sensory loss  9. Best Language: 0-->No aphasia, normal  10. Dysarthria: 1-->Mild-to-moderate dysarthria, patient slurs at least some words and, at worst, can be  "understood with some difficulty  11. Extinction and Inattention (formerly Neglect): 0-->No abnormality  Total (NIH Stroke Scale): 6     Modified [place] Coma Scale:    ABCD2 Score:    [id number] Score:   HAS -BLED Score:   ICH Score:   Hunt & Larios Classification:       Blood pressure (!) 198/106, pulse 90, temperature 97.8 °F (36.6 °C), temperature source Oral, resp. rate 18, height 5' 4" (1.626 m), weight 102.1 kg (225 lb).  Alteplase Eligible?: No  Alteplase Recommendation: Alteplase not recommended due to Outside of treatment window   Possible Interventional Revascularization Candidate? Awaiting CTA results from Spoke for determination     Disposition Recommendation: pending further studies    Subjective:     History of Present Illness:  67 yo female with L weakness and associated face droop and slurred speech.  No clear triggers.  Has not had in past.  Has not improved.  Has not been treated.        Woke up with symptoms?: yes    Recent bleeding noted: no  Does the patient take any Blood Thinners? no  Medications: No relevant medications      Past Medical History: hypertension    Past Surgical History: none    Family History: no relevant history    Social History: no smoking, no drinking, no drugs    Allergies: No Known Allergies     Review of Systems   Constitutional: Negative for chills and fever.   HENT: Negative for nosebleeds and sore throat.    Eyes: Negative for photophobia, pain and redness.   Respiratory: Negative for cough and shortness of breath.    Cardiovascular: Negative for chest pain and palpitations.   Gastrointestinal: Negative for abdominal pain, blood in stool, diarrhea and nausea.   Endocrine: Negative for cold intolerance and heat intolerance.   Genitourinary: Negative for difficulty urinating and hematuria.   Musculoskeletal: Negative for arthralgias, back pain and neck pain.   Skin: Negative for color change and rash.   Neurological: Negative for light-headedness and " "headaches.   Hematological: Does not bruise/bleed easily.   Psychiatric/Behavioral: Negative for confusion and hallucinations.     Objective:   Vitals: Blood pressure (!) 198/106, pulse 90, temperature 97.8 °F (36.6 °C), temperature source Oral, resp. rate 18, height 5' 4" (1.626 m), weight 102.1 kg (225 lb).     CT READ: Yes  No hemmorhage. No mass effect. No early infarct signs.     Physical Exam  Vitals reviewed.   Constitutional:       Appearance: She is well-developed.   HENT:      Head: Normocephalic and atraumatic.      Right Ear: External ear normal.      Left Ear: External ear normal.   Eyes:      Conjunctiva/sclera: Conjunctivae normal.   Neck:      Trachea: No tracheal deviation.   Pulmonary:      Effort: Pulmonary effort is normal. No respiratory distress.   Abdominal:      General: There is no distension.   Musculoskeletal:         General: Normal range of motion.      Cervical back: Normal range of motion.   Skin:     General: Skin is dry.   Neurological:      Mental Status: She is alert.   Psychiatric:         Behavior: Behavior normal.         Thought Content: Thought content normal.         Judgment: Judgment normal.               Recommended the emergency room physician to have a brief discussion with the patient and/or family if available regarding the  risks and benefits of treatment, and to briefly document the occurrence of that discussion in his clinical encounter note.     The attending portion of this evaluation, treatment, and documentation was performed per Mian Sousa MD via audiovisual.    Billing code:  (time dependent stroke, complex case, unstable patient, hemorrhages, any intervention, some mimics)    · This patient has a very critical neurological condition/illness, with very high morbidity and mortality.  · There is a very high probability for acute neurological change leading to clinical and possibly life-threatening deterioration requiring highest level of physician " preparedness for urgent intervention.  · There is possibility that this condition will require treatment with high risk medications as quickly as possible.  · There is also a possibility that the patient may benefit from further, more advance complex therapies (e.g. endovascular therapy) that will require prompt diagnosis and care.  · Care was coordinated with other physicians involved in the patient's care.  · Radiologic studies and laboratory data were reviewed and interpreted, and plan of care was re-assessed based on the results.  · Diagnosis, treatment options and prognosis may have been discussed with the patient and/or family members or caregiver.  · Further advanced medical management and further evaluation is warranted for his care.      In your opinion, this was a: Tier 1 Van Positive    Consult End Time: 8:57 AM     Mian Sousa MD  Comprehensive Stroke Center  Vascular Neurology   [facility]

## 2022-02-22 LAB
ANION GAP SERPL CALC-SCNC: 12 MMOL/L (ref 8–16)
AORTIC ROOT ANNULUS: 2.85 CM
AORTIC VALVE CUSP SEPERATION: 2.04 CM
AV INDEX (PROSTH): 0.76
AV MEAN GRADIENT: 5 MMHG
AV PEAK GRADIENT: 10 MMHG
AV VALVE AREA: 2.29 CM2
AV VELOCITY RATIO: 0.62
BASOPHILS # BLD AUTO: 0.03 K/UL (ref 0–0.2)
BASOPHILS NFR BLD: 0.2 % (ref 0–1.9)
BSA FOR ECHO PROCEDURE: 2.15 M2
BUN SERPL-MCNC: 19 MG/DL (ref 8–23)
CALCIUM SERPL-MCNC: 9.5 MG/DL (ref 8.7–10.5)
CHLORIDE SERPL-SCNC: 107 MMOL/L (ref 95–110)
CK MB SERPL-MCNC: 4.5 NG/ML (ref 0.1–6.5)
CK MB SERPL-RTO: 7.3 % (ref 0–5)
CK SERPL-CCNC: 62 U/L (ref 20–180)
CO2 SERPL-SCNC: 21 MMOL/L (ref 23–29)
CREAT SERPL-MCNC: 1 MG/DL (ref 0.5–1.4)
CV ECHO LV RWT: 0.37 CM
DIFFERENTIAL METHOD: ABNORMAL
DOP CALC AO PEAK VEL: 1.57 M/S
DOP CALC AO VTI: 32.74 CM
DOP CALC LVOT AREA: 3 CM2
DOP CALC LVOT DIAMETER: 1.96 CM
DOP CALC LVOT PEAK VEL: 0.98 M/S
DOP CALC LVOT STROKE VOLUME: 75.03 CM3
DOP CALC MV VTI: 26.67 CM
DOP CALCLVOT PEAK VEL VTI: 24.88 CM
E WAVE DECELERATION TIME: 230.55 MSEC
E/A RATIO: 1.02
E/E' RATIO: 11.07 M/S
ECHO LV POSTERIOR WALL: 0.88 CM (ref 0.6–1.1)
EJECTION FRACTION: 63 %
EOSINOPHIL # BLD AUTO: 0 K/UL (ref 0–0.5)
EOSINOPHIL NFR BLD: 0.1 % (ref 0–8)
ERYTHROCYTE [DISTWIDTH] IN BLOOD BY AUTOMATED COUNT: 12.8 % (ref 11.5–14.5)
EST. GFR  (AFRICAN AMERICAN): >60 ML/MIN/1.73 M^2
EST. GFR  (NON AFRICAN AMERICAN): 59 ML/MIN/1.73 M^2
ESTIMATED AVG GLUCOSE: 114 MG/DL (ref 68–131)
FRACTIONAL SHORTENING: 34 % (ref 28–44)
GLUCOSE SERPL-MCNC: 191 MG/DL (ref 70–110)
HBA1C MFR BLD: 5.6 % (ref 4–5.6)
HCT VFR BLD AUTO: 45.7 % (ref 37–48.5)
HGB BLD-MCNC: 15 G/DL (ref 12–16)
IMM GRANULOCYTES # BLD AUTO: 0.07 K/UL (ref 0–0.04)
IMM GRANULOCYTES NFR BLD AUTO: 0.5 % (ref 0–0.5)
INTERVENTRICULAR SEPTUM: 0.94 CM (ref 0.6–1.1)
LEFT ATRIUM SIZE: 3.73 CM
LEFT INTERNAL DIMENSION IN SYSTOLE: 3.13 CM (ref 2.1–4)
LEFT VENTRICLE DIASTOLIC VOLUME INDEX: 50.84 ML/M2
LEFT VENTRICLE DIASTOLIC VOLUME: 104.74 ML
LEFT VENTRICLE MASS INDEX: 72 G/M2
LEFT VENTRICLE SYSTOLIC VOLUME INDEX: 18.8 ML/M2
LEFT VENTRICLE SYSTOLIC VOLUME: 38.7 ML
LEFT VENTRICULAR INTERNAL DIMENSION IN DIASTOLE: 4.75 CM (ref 3.5–6)
LEFT VENTRICULAR MASS: 147.38 G
LV LATERAL E/E' RATIO: 10.38 M/S
LV SEPTAL E/E' RATIO: 11.86 M/S
LYMPHOCYTES # BLD AUTO: 0.8 K/UL (ref 1–4.8)
LYMPHOCYTES NFR BLD: 5.6 % (ref 18–48)
MAGNESIUM SERPL-MCNC: 2 MG/DL (ref 1.6–2.6)
MCH RBC QN AUTO: 29.5 PG (ref 27–31)
MCHC RBC AUTO-ENTMCNC: 32.8 G/DL (ref 32–36)
MCV RBC AUTO: 90 FL (ref 82–98)
MONOCYTES # BLD AUTO: 0.1 K/UL (ref 0.3–1)
MONOCYTES NFR BLD: 0.5 % (ref 4–15)
MV MEAN GRADIENT: 1 MMHG
MV PEAK A VEL: 0.81 M/S
MV PEAK E VEL: 0.83 M/S
MV PEAK GRADIENT: 5 MMHG
MV STENOSIS PRESSURE HALF TIME: 63.26 MS
MV VALVE AREA BY CONTINUITY EQUATION: 2.81 CM2
MV VALVE AREA P 1/2 METHOD: 3.48 CM2
NEUTROPHILS # BLD AUTO: 13.5 K/UL (ref 1.8–7.7)
NEUTROPHILS NFR BLD: 93.1 % (ref 38–73)
NRBC BLD-RTO: 0 /100 WBC
PHOSPHATE SERPL-MCNC: 2.5 MG/DL (ref 2.7–4.5)
PLATELET # BLD AUTO: 345 K/UL (ref 150–450)
PMV BLD AUTO: 9.7 FL (ref 9.2–12.9)
POTASSIUM SERPL-SCNC: 4 MMOL/L (ref 3.5–5.1)
PV PEAK VELOCITY: 0.91 CM/S
RA PRESSURE: 3 MMHG
RBC # BLD AUTO: 5.08 M/UL (ref 4–5.4)
RIGHT VENTRICULAR END-DIASTOLIC DIMENSION: 3.08 CM
RV TISSUE DOPPLER FREE WALL SYSTOLIC VELOCITY 1 (APICAL 4 CHAMBER VIEW): 18.42 CM/S
SODIUM SERPL-SCNC: 140 MMOL/L (ref 136–145)
TDI LATERAL: 0.08 M/S
TDI SEPTAL: 0.07 M/S
TDI: 0.08 M/S
TRICUSPID ANNULAR PLANE SYSTOLIC EXCURSION: 2.84 CM
WBC # BLD AUTO: 14.49 K/UL (ref 3.9–12.7)

## 2022-02-22 PROCEDURE — 12000002 HC ACUTE/MED SURGE SEMI-PRIVATE ROOM

## 2022-02-22 PROCEDURE — 94761 N-INVAS EAR/PLS OXIMETRY MLT: CPT

## 2022-02-22 PROCEDURE — 93005 ELECTROCARDIOGRAM TRACING: CPT

## 2022-02-22 PROCEDURE — 92610 EVALUATE SWALLOWING FUNCTION: CPT

## 2022-02-22 PROCEDURE — 93010 EKG 12-LEAD: ICD-10-PCS | Mod: ,,, | Performed by: SPECIALIST

## 2022-02-22 PROCEDURE — 99223 PR INITIAL HOSPITAL CARE,LEVL III: ICD-10-PCS | Mod: ,,, | Performed by: SPECIALIST

## 2022-02-22 PROCEDURE — 92523 SPEECH SOUND LANG COMPREHEN: CPT

## 2022-02-22 PROCEDURE — 83036 HEMOGLOBIN GLYCOSYLATED A1C: CPT | Performed by: INTERNAL MEDICINE

## 2022-02-22 PROCEDURE — 84100 ASSAY OF PHOSPHORUS: CPT | Performed by: INTERNAL MEDICINE

## 2022-02-22 PROCEDURE — 99223 1ST HOSP IP/OBS HIGH 75: CPT | Mod: ,,, | Performed by: SPECIALIST

## 2022-02-22 PROCEDURE — 85025 COMPLETE CBC W/AUTO DIFF WBC: CPT | Performed by: INTERNAL MEDICINE

## 2022-02-22 PROCEDURE — 80048 BASIC METABOLIC PNL TOTAL CA: CPT | Performed by: INTERNAL MEDICINE

## 2022-02-22 PROCEDURE — 97161 PT EVAL LOW COMPLEX 20 MIN: CPT

## 2022-02-22 PROCEDURE — 25000003 PHARM REV CODE 250: Performed by: EMERGENCY MEDICINE

## 2022-02-22 PROCEDURE — 63600175 PHARM REV CODE 636 W HCPCS: Performed by: INTERNAL MEDICINE

## 2022-02-22 PROCEDURE — 97165 OT EVAL LOW COMPLEX 30 MIN: CPT

## 2022-02-22 PROCEDURE — 93010 ELECTROCARDIOGRAM REPORT: CPT | Mod: ,,, | Performed by: SPECIALIST

## 2022-02-22 PROCEDURE — 83735 ASSAY OF MAGNESIUM: CPT | Performed by: INTERNAL MEDICINE

## 2022-02-22 PROCEDURE — 27000221 HC OXYGEN, UP TO 24 HOURS

## 2022-02-22 PROCEDURE — 82553 CREATINE MB FRACTION: CPT | Performed by: INTERNAL MEDICINE

## 2022-02-22 PROCEDURE — 36415 COLL VENOUS BLD VENIPUNCTURE: CPT | Performed by: INTERNAL MEDICINE

## 2022-02-22 PROCEDURE — 25000003 PHARM REV CODE 250: Performed by: INTERNAL MEDICINE

## 2022-02-22 RX ORDER — ENOXAPARIN SODIUM 100 MG/ML
1 INJECTION SUBCUTANEOUS
Status: DISCONTINUED | OUTPATIENT
Start: 2022-02-22 | End: 2022-02-24 | Stop reason: HOSPADM

## 2022-02-22 RX ORDER — METOPROLOL TARTRATE 25 MG/1
12.5 TABLET ORAL 2 TIMES DAILY
Status: DISCONTINUED | OUTPATIENT
Start: 2022-02-22 | End: 2022-02-23

## 2022-02-22 RX ADMIN — METOPROLOL TARTRATE 12.5 MG: 25 TABLET, FILM COATED ORAL at 11:02

## 2022-02-22 RX ADMIN — CLOPIDOGREL 75 MG: 75 TABLET, FILM COATED ORAL at 09:02

## 2022-02-22 RX ADMIN — ASPIRIN 81 MG: 81 TABLET, COATED ORAL at 09:02

## 2022-02-22 RX ADMIN — ATORVASTATIN CALCIUM 80 MG: 40 TABLET, FILM COATED ORAL at 09:02

## 2022-02-22 RX ADMIN — ENOXAPARIN SODIUM 100 MG: 100 INJECTION SUBCUTANEOUS at 10:02

## 2022-02-22 RX ADMIN — ENOXAPARIN SODIUM 100 MG: 100 INJECTION SUBCUTANEOUS at 11:02

## 2022-02-22 RX ADMIN — METOPROLOL TARTRATE 12.5 MG: 25 TABLET, FILM COATED ORAL at 10:02

## 2022-02-22 NOTE — CONSULTS
Thank you for your consult to [facility]. We have reviewed the patient chart. This patient does not meet criteria for [facility] service at this time due to [facility] service capacity limitations. Will hand back to In-house service.     Sridhar Boggs M.D.  Department of [facility]

## 2022-02-22 NOTE — PT/OT/SLP EVAL
Physical Therapy Evaluation and Discharge Note    Patient Name:  Tameka Hoffman   MRN:  [id number]    Recommendations:     Discharge Recommendations:  home   Discharge Equipment Recommendations: none   Barriers to discharge: None    Assessment:     Tameka Hoffman is a 66 y.o. female admitted with a medical diagnosis of Thrombotic stroke involving right middle cerebral artery. .  At this time, patient is functioning at their prior level of function and does not require further acute PT services.     Pt seen in room post showering- pt ambulating independently, combing hair and applying facial cream. Pt ambulated at hallways 500ft with SBA. Has steady gait patterns with no loss of balance.  No acute PT needs    Recent Surgery: * No surgery found *      Plan:     During this hospitalization, patient does not require further acute PT services.  Please re-consult if situation changes.      Subjective     Chief Complaint: face is red-  Patient/Family Comments/goals: get home  Pain/Comfort:  · Pain Rating 1: 0/10    Patients cultural, spiritual, Orthodox conflicts given the current situation:      Living Environment:  Home with spouse in an elevated home with lift  Prior to admission, patients level of function was independent.  Equipment used at home: none.  DME owned (not currently used): none.  Upon discharge, patient will have assistance from spouse.    Objective:     Communicated with nurse Galeano prior to session.  Patient found ambulatory in room/galvez with telemetry upon PT entry to room.    General Precautions: Standard,     Orthopedic Precautions:N/A   Braces: N/A   Respiratory Status: Room air    Exams:  · Postural Exam:  Patient presented with the following abnormalities:    · -       Rounded shoulders  · -       Forward head  · -       BMI 38  · RLE ROM: WFL  · RLE Strength: WFL  · LLE ROM: WFL  · LLE Strength: WFL    Functional Mobility:  · Bed Mobility:     · Sit to Supine: independence  · Transfers:      · Sit to Stand:  independence with no AD  · Gait: 500ft with SBA with no gait deficits    AM-PAC 6 CLICK MOBILITY  Total Score:23       Therapeutic Activities and Exercises:   Patient was educated on the importance of OOB activity and functional mobility to negate negative effects of prolonged bed rest during hospitalization, safe transfers and ambulation, and D/C planning   Pt encouraged exercises and activities    AM-PAC 6 CLICK MOBILITY  Total Score:23     Patient left HOB elevated with all lines intact, call button in reach and spouse present.    GOALS:   Multidisciplinary Problems     Physical Therapy Goals     Not on file                History:     No past medical history on file.    No past surgical history on file.    Time Tracking:     PT Received On: 02/22/22  PT Start Time: 1415     PT Stop Time: 1425  PT Total Time (min): 10 min     Billable Minutes: Evaluation 10      02/22/2022

## 2022-02-22 NOTE — PLAN OF CARE
[facility]  Initial Discharge Assessment       Primary Care Provider: Primary Doctor No    Admission Diagnosis: Stroke [I63.9]  Cerebrovascular accident (CVA), unspecified mechanism [I63.9]    Admission Date: 2/21/2022  Expected Discharge Date:       met with patient at bedside. Demographics and insurance verified. Patient reports that she and her spouse live at the [facility] part time.  Maintain residence is in [place]. Patient PCP in [place] is Kristofer Dexter. Case management to assist with additional needs upon discharge. No further needs to discuss at this time.    Discharge Barriers Identified: None    Payor: MEDICARE / Plan: MEDICARE PART A & B / Product Type: Government /     Extended Emergency Contact Information  Primary Emergency Contact: Jose Joe   [place]  Mobile Phone: [phone]  Relation: Spouse  Secondary Emergency Contact: DANIA RUST  Mobile Phone: [phone]  Relation: Sister  Preferred language: English   needed? No    Discharge Plan A: Home with family  Discharge Plan B: Home with family      [id number] - [place], [place] - [address] [facility]  [address]  [facility] [address]  Phone: [phone] Fax: [phone]      Initial Assessment (most recent)     Adult Discharge Assessment - 02/22/22 1223        Discharge Assessment    Assessment Type Discharge Planning Assessment     Confirmed/corrected address, phone number and insurance Yes     Confirmed Demographics Correct on Facesheet     Source of Information patient     Lives With spouse     Do you expect to return to your current living situation? Yes     Do you have help at home or someone to help you manage your care at home? Yes     Who are your caregiver(s) and their phone number(s)? Jose Joe (Spouse)   [phone] (Mobile)     Prior to hospitilization cognitive status: Alert/Oriented     Current cognitive  status: Alert/Oriented     Walking or Climbing Stairs Difficulty none     Dressing/Bathing Difficulty none     Home Accessibility wheelchair accessible   Patient reports that she lives in a raised home with a lift    Home Layout Able to live on 1st floor     Equipment Currently Used at Home none     Readmission within 30 days? No     Do you take prescription medications? Yes     Do you have prescription coverage? Yes     Do you have any problems affording any of your prescribed medications? No     Is the patient taking medications as prescribed? yes     Who is going to help you get home at discharge? Jose Joe (Spouse)   [phone] (Mobile)     How do you get to doctors appointments? car, drives self     Are you on dialysis? No     Do you take coumadin? No     Discharge Plan A Home with family     Discharge Plan B Home with family     DME Needed Upon Discharge  none     Discharge Plan discussed with: Patient     Discharge Barriers Identified None

## 2022-02-22 NOTE — CARE UPDATE
02/22/22 0729   Patient Assessment/Suction   Level of Consciousness (AVPU) alert   Respiratory Effort Unlabored   Rhythm/Pattern, Respiratory unlabored   PRE-TX-O2   O2 Device (Oxygen Therapy) nasal cannula   $ Is the patient on Low Flow Oxygen? Yes   Flow (L/min) 2  (weaned to 2L)   SpO2 98 %   Pulse Oximetry Type Intermittent   $ Pulse Oximetry - Multiple Charge Pulse Oximetry - Multiple   Pulse 80   Resp 18

## 2022-02-22 NOTE — PROGRESS NOTES
[facility]  Progress Note  Date: 2022 9:38 AM            Patient Name: Tameka Hoffman   MRN: [id number]   : 1955    AGE: 66 y.o.    LOS: 1 days Hospital Day: 2  Admit date: 2022  8:02 AM     HPI: Tameka Hoffman is a 66 y.o. female with a history of hypertension, presents to the ER for left-sided weakness, gait imbalance which she noticed when she woke up this morning.     Patient states that she went to bed around 12:00 a.m. and she was normal at that time.  Woke up around 5:30 a.m. and noticed that she was weak on left upper and lower extremities and was not able to walk.  She presented to the ER for these symptoms.  In the ER, she had a tele stroke evaluation and had NIH stroke scale was 6. CT head showed early ischemic changes in the right MCA territory.  She was not a candidate for tPA as she was outside treatment window.  CT angiogram head and neck showed a filling defect in the distal right M2 branch.  Patient was not a candidate for intervention per tele stroke due to her distal occlusion.     I have seen the patient in the ER.  On exam, she had a left facial droop, she had a chronic right eye ptosis and mild left-sided weakness.  NIH stroke scale was 3 on my exam.     Patient is being admitted for further stroke workup and management.     She denies any history of stroke in the past, she is compliant with antihypertensive medications.  She is not on aspirin, Plavix or any anticoagulation at home.  She is not a smoker.    2022: Overnight, patient went into AFib with RVR. Patient was seen and examined by me this morning. Neuro exam improved.  She has no significant facial droop and her weakness on the left side has improved as well.       Vitals:  Patient Vitals for the past 24 hrs:   BP Temp Temp src Pulse Resp SpO2 Height Weight   22 0724 135/62 96.6 °F (35.9 °C) Oral 75 16 97 % -- --   22 0451 -- -- -- -- -- 98 % -- --   22 0401 (!) 98/55 97.6  "°F (36.4 °C) Oral 75 18 (!) 92 % -- --   02/22/22 0400 (!) 98/55 97.6 °F (36.4 °C) Oral 75 18 (!) 92 % -- --   02/22/22 0000 (!) 93/50 98 °F (36.7 °C) Oral 76 18 95 % -- --   02/21/22 2000 116/79 98 °F (36.7 °C) Oral 89 18 97 % -- --   02/21/22 1743 (!) 144/81 98.4 °F (36.9 °C) -- 82 18 97 % 5' 4" (1.626 m) 102.1 kg (225 lb)   02/21/22 1623 (!) 144/81 98.4 °F (36.9 °C) -- 82 18 97 % -- --   02/21/22 1622 (!) 141/85 -- -- 79 19 96 % -- --   02/21/22 1610 133/64 98.4 °F (36.9 °C) -- 82 18 97 % -- --   02/21/22 1608 -- -- -- 75 -- 98 % -- --   02/21/22 1605 -- -- -- -- 19 -- -- --   02/21/22 1600 133/72 -- -- 78 19 98 % -- --   02/21/22 1544 -- -- -- 75 -- 95 % -- --   02/21/22 1530 125/73 -- -- 86 18 95 % -- --   02/21/22 1500 133/74 -- -- 74 18 97 % -- --   02/21/22 1430 128/69 -- -- 74 16 97 % -- --   02/21/22 1400 132/61 -- -- 75 19 95 % -- --   02/21/22 1330 (!) 144/66 -- -- 81 18 97 % -- --   02/21/22 1300 (!) 169/82 -- -- 82 17 97 % -- --   02/21/22 1230 135/79 -- -- 75 18 96 % -- --   02/21/22 1200 136/77 -- -- 79 19 98 % -- --   02/21/22 1130 (!) 143/80 -- -- 78 19 96 % -- --   02/21/22 1100 (!) 160/79 -- -- 86 18 98 % -- --   02/21/22 1015 (!) 147/78 -- -- 84 18 96 % -- --   02/21/22 1000 (!) 148/70 -- -- 75 19 95 % -- --   02/21/22 0945 (!) 169/87 -- -- 83 18 96 % -- --     PHYSICAL EXAM:     GENERAL APPEARANCE: Well-developed, well-nourished female in no acute distress.  HEENT: Normocephalic and atraumatic. PERRL. Oropharynx unremarkable.  PULM: Comfortable on room air.  CV: RRR.  ABDOMEN: Soft, nontender.  EXTREMITIES: No signs of vascular compromise. Pulses present. No cyanosis, clubbing or edema.  SKIN: Clear; no rashes, lesions or skin breaks in exposed areas.      NEURO:   MENTAL STATUS: Patient awake and oriented to time, place, and person. Affect normal.  CRANIAL NERVES II-XII: Pupils equal, round and reactive to light. Extraocular movements full and intact. No facial asymmetry.  Right eye ptosis " which is chronic  MOTOR: Normal bulk. Tone normal and symmetrical throughout.  No abnormal movements. No tremor.   Strength 5/5 throughout unless specified below.  REFLEXES: DTRs 2+; normal and symmetric throughout.   SENSATION: Sensation grossly intact to fine touch.  COORDINATION: Finger-to-nose normal for age and symmetric.  STATION: Romberg deferred.  GAIT: Deferred.    CURRENT SCHEDULED MEDICATIONS:   aspirin  81 mg Oral Daily    atorvastatin  80 mg Oral Daily    clopidogreL  75 mg Oral Daily    enoxaparin  1 mg/kg Subcutaneous Q12H    metoprolol tartrate  12.5 mg Oral BID     CURRENT INFUSIONS:    DATA:  Recent Labs   Lab 02/21/22  0853 02/22/22  0451    140   K 4.0 4.0    107   CO2 23 21*   BUN 21 19   CREATININE 0.9 1.0   * 191*   CALCIUM 10.8* 9.5   PHOS  --  2.5*   MG  --  2.0   AST 21  --    ALT 24  --      Recent Labs   Lab 02/21/22  0853 02/22/22  0451   WBC 9.57 14.49*   HGB 13.4 15.0   HCT 42.3 45.7    345     No results found for: PROTEINCSF, GLUCCSF, WBCCSF, RBCCSF  No results found for: HGBA1C         I have personally reviewed and interpreted the pertinent imaging and lab results.  Imaging Results          US Carotid Bilateral (Final result)  Result time 02/21/22 11:15:49    Final result by Leonard Young MD (02/21/22 11:15:49)                 Impression:      No evidence of a hemodynamically significant carotid bifurcation stenosis.      Electronically signed by: Leonard Young  Date:    02/21/2022  Time:    11:15             Narrative:    EXAMINATION:  US CAROTID BILATERAL    CLINICAL HISTORY:  stroke;    TECHNIQUE:  Grayscale and color Doppler ultrasound examination of the carotid and vertebral artery systems bilaterally.  Stenosis estimates are per the NASCET measurement criteria.    COMPARISON:  None.    FINDINGS:  Right:    Internal Carotid Artery (ICA) peak systolic velocity 114 cm/sec    ICA/CCA peak systolic velocity ratio: 1.5    Plaque formation:  Homogeneous    Vertebral artery: Antegrade flow and normal waveform.    Left:    Internal Carotid Artery (ICA)  peak systolic velocity 85 cm/sec    ICA/CCA peak systolic velocity ratio: 1.1    Plaque formation: Homogeneous    Vertebral artery: Antegrade flow and normal waveform.                               MRI BRAIN WITHOUT CONTRAST (Final result)  Result time 02/21/22 13:29:37    Final result by Tereso Liriano IV, MD (02/21/22 13:29:37)                 Impression:      Diffusion positivity along the sylvian fissure on the right suggestive of a recent or acute region of ischemia/infarction      Electronically signed by: Tereso Liriano MD  Date:    02/21/2022  Time:    13:29             Narrative:    EXAMINATION:  MRI BRAIN WITHOUT CONTRAST    CLINICAL HISTORY:  Stroke;    TECHNIQUE:  Multiplanar multisequence MR imaging of the brain was performed without contrast.    COMPARISON:  None.    FINDINGS:  A T1 hypointensity is noted within the right cerebellar hemisphere suggestive a region of gliosis.  The ventricles and sulci appear age-appropriate.  Some mild periventricular white matter changes are noted suggestive of chronic microvascular ischemic change.  No gradient susceptibility is noted to suggest the presence of acute blood products.    Some diffusion positivity is noted along the sylvian fissure on the right along the cortex anteriorly in particular suggestive a region of infarction.  Mild increased FLAIR signal is noted in this region.  The region of diffusion positivity axially appears to span approximately 3 x 3 cm                               CTA Head and Neck (xpd) (Final result)  Result time 02/21/22 10:06:23    Final result by Leonard Young MD (02/21/22 10:06:23)                 Impression:      1. Filling defect in a distal right M2 branch correlating to an area of infarct/ischemia in the right frontal lobe.  2. Right pulmonary nodule.  In a low risk patient, no further follow-up is  recommended.  In a high-risk patient, 12 month follow-up CT could be considered.  Findings were discussed with Dr. Cook by telephone at 09:50 hours.      Electronically signed by: Leonard Young  Date:    02/21/2022  Time:    10:06             Narrative:    EXAMINATION:  CTA HEAD AND NECK (XPD)    CLINICAL HISTORY:  Neuro deficit, acute, stroke suspected;    TECHNIQUE:  CT angiogram was performed from the level of the ritu to the top of the head following the IV administration of 75mL of Omnipaque 350.   Sagittal and coronal reconstructions and maximum intensity projection reconstructions were performed. Arterial stenosis percentages are based on NASCET measurement criteria.    COMPARISON:  Same-day head CT    FINDINGS:  Subtle loss of gray-white differentiation in the posterior right frontal lobe.  Normal size of the ventricular system.  Mild mucosal thickening in the ethmoid sinuses.  Mild mucosal thickening right maxillary sinus.  No mass or abnormal enhancement along the aero digestive tract.  Glandular tissue in the neck unremarkable.  No cervical adenopathy.  Some motion limits assessment of fine detail in the lung apices.  There is a calcified granuloma in the left upper lobe and at the right lung apex there is a 3 mm nodule series 2, image 89.  Mild multilevel degenerative changes in the spine.    There is a bovine arch configuration.  Right subclavian artery is patent.  Left subclavian artery demonstrates a short segment of approximately 50% stenosis near the thoracic inlet likely due to extrinsic muscular compression of (axial series 2, image 142.  Right common carotid artery is patent.  There is plaque along the right carotid bulb with less than 50% stenosis.  Right ICA is patent.  Left common carotid artery is patent.  There is plaque along the left carotid bulb with less than 50% stenosis.  Left ICA is patent.  There is a filling defect involving a distal right M2 branch extending over a 9 mm  distance near the sylvian fissure.  This can be seen on axial series 2, image 416-431 and coronal series 601, image 42.  There is some reconstitution of flow distal to this segment.  Left MCA and bilateral ACAs are patent.  There is an anterior communicating artery.    Bilateral vertebral arteries are patent.  Patient is Co vertebral artery dominant.  The basilar artery and bilateral PCAs are patent.  There are bilateral posterior communicating arteries.                               CT Head Without Contrast (Final result)  Result time 02/21/22 08:42:50    Final result by Moe Huertas MD (02/21/22 08:42:50)                 Impression:      Findings suspicious for an early right MCA territory ischemic infarct.  No intracranial hemorrhage.  Consider further evaluation with CTA and/or MRI.    COMMUNICATION  This critical result was discovered/received at 08:30.  The critical information above was relayed directly by me by telephone to Dr. Artur Cook on 02/21/2022 at 08:37.      Electronically signed by: Moe Huertas MD  Date:    02/21/2022  Time:    08:42             Narrative:    EXAMINATION:  CT HEAD WITHOUT CONTRAST    CLINICAL HISTORY:  Neuro deficit, acute, stroke suspected;    TECHNIQUE:  Low dose axial images were obtained through the head.  Coronal and sagittal reformations were also performed. Contrast was not administered.    COMPARISON:  None.    FINDINGS:  There are areas of subtle loss of gray-white differentiation in the right frontal region suspicious for an acute or early subacute infarct.  There is no intracranial hemorrhage.  No associated mass effect.  The ventricles are nondilated.                                        ASSESSMENT AND PLAN:    Acute ischemic right MCA stroke  Hypertension  Atrial fibrillation     Etiology of right MCA stroke is likely cardioembolic in the setting of new diagnosis of AFib.     Workup  · CTH:  Areas of loss of gray-white differentiation in the right  frontal region suspicious for acute to early subacute ischemic infarct.  No intracranial hemorrhage  · CTA head and neck:  Distal right M2 filling defect which could be an occlusion.  · MRI brain: Acute ischemic R MCA stroke in the right frontal and temporal lobe  · ECHO:  pending   · LDL: 94.6   · HbA1c: pending            Plan  · Admitted for further stroke workup  · Patient is found to be in atrial fibrillation with RVR and etiology of stroke is likely cardioembolic in setting of atrial fibrillation.  Will recommend starting anticoagulation and can use direct oral anticoagulants unless echocardiogram had to show an intracardiac thrombus.  · Stop antiplatelet therapy after starting anticoagulation  · Permissive BP to 220 systolic for 48 hrs from symptom onset and after that normalize BP  · If TTE is negative, patient will need a BOB to rule out intracardiac thrombus  · PT OT  · Speech therapy  · DVT prophylaxis with chemo/SCD prophylaxis  · Extensively discussed lifestyle modifications as prophylactic measures for stroke prevention including, adequate blood pressure management, healthy diet and regular exercise.          45 minutes of care time has been spent evaluating with the patient. Time includes chart review not limited to diagnostic imaging, labs, and vitals, patient assessment, discussion with family and nursing, current order evaluations, and new order entries.      Lane Brock MD  Neurology/vascular Neurology  Date of Service: 02/22/2022  9:38 AM    Please note: This note was transcribed using voice recognition software. Because of this technology there are often uinintended grammatical, spelling, and other transcription errors. Please disregard these errors.

## 2022-02-22 NOTE — PROGRESS NOTES
Pt rec'd with hives like rash scattered around chest that was reported to be an allergic reaction to the tele lead adhesive. Per day shift nurse, pt rec'd PO  Benadryl. Within an hour of shift, pt noted to be severely red and swollen with itching and heat noted to skin. On Call NP Moe Oconnor notified and arrived at bedside to assess. New orders for IV benadryl, IV Pepcid, and IV Solu-medrol x 1. In preparing  To administer those meds, rec'd a call from monitor room that pt was in afib w RVR. Returned to pt's room to assess. Pt denied CP  And VSS except heart rate noted to be 170. NP notified again and returned to bedside to assess. EKG confirmed rhythm. New Orders rec'd for IVP Diltiazem x1 and diltiazem drip at rate of 5cc/hr. Pt and her , who is at bedside, educated and verbalized understanding of POC. All orders implemented and tele leads replaced for accurate readings. @ approx 2330, NP called to check on patient's rate, which was still 120/s and NP increased diliazem rate to 10cc/hr. At approx 0115, pt returned to NSR with rate of 75. EKG obtained for confirmation. NSR confirmed. Pt began to run hypotensive with bp 93/50. MAP 66. On Call NP, Juani Sparks, notified of pts rate, rhythm and BP. New orders rec'd to reduce rate of drip back to 5cc/hr. Rate reduced at approx 0130. At 0430, after pt noted to be in NSR x 3 hours, NP notified with new orders to  D/C diltiazem drip. Drip d/c'd per order. By 0500 body rash is completely gone and edema has decreased. Pt states she feels the best she has felt in a long time. Pt noted to be mildly diaphoretic. All VSS. o2 noted to be 91% on RA. NC placed at 3L. No distress noted. Safety measures  In place. Pt slept t/o night.

## 2022-02-22 NOTE — PT/OT/SLP EVAL
Speech Language Pathology   Cognitive/Bedside Swallow Evaluation  & DISCHARGE     Patient Name:  Tameka Hoffman   MRN:  [id number]  Admitting Diagnosis: Thrombotic stroke involving right middle cerebral artery    Recommendations:                  General Recommendations:  Follow-up not indicated  Diet recommendations:  Regular, Thin   Aspiration Precautions: Standard aspiration precautions   General Precautions: Standard,    Communication strategies:  none    History:     No past medical history on file.    No past surgical history on file.    Social History: Patient lives with .    Prior Intubation HX:  None this admit    Modified Barium Swallow: None in Epic    Imaging:  US Carotid Bilateral   Final Result      No evidence of a hemodynamically significant carotid bifurcation stenosis.         Electronically signed by: Leonard Young   Date:    02/21/2022   Time:    11:15      MRI BRAIN WITHOUT CONTRAST   Final Result      Diffusion positivity along the sylvian fissure on the right suggestive of a recent or acute region of ischemia/infarction         Electronically signed by: Tereso Liriano MD   Date:    02/21/2022   Time:    13:29      CTA Head and Neck (xpd)   Final Result      1. Filling defect in a distal right M2 branch correlating to an area of infarct/ischemia in the right frontal lobe.   2. Right pulmonary nodule.  In a low risk patient, no further follow-up is recommended.  In a high-risk patient, 12 month follow-up CT could be considered.   Findings were discussed with Dr. Cook by telephone at 09:50 hours.         Electronically signed by: Leonard Young   Date:    02/21/2022   Time:    10:06      CT Head Without Contrast   Final Result      Findings suspicious for an early right MCA territory ischemic infarct.  No intracranial hemorrhage.  Consider further evaluation with CTA and/or MRI.      COMMUNICATION   This critical result was discovered/received at 08:30.  The critical information above was  "relayed directly by me by telephone to Dr. Artur Cook on 02/21/2022 at 08:37.         Electronically signed by: Moe Huertas MD   Date:    02/21/2022   Time:    08:42           Prior diet: Regular/thin.    Occupation/hobbies/homemaking: PLOF as independent, retired . +driving.    Subjective     "I'm so grateful."  "I feel fine now."    Pain/Comfort:  · Pain Rating 1: 0/10    Respiratory Status: Nasal cannula, flow 2 L/min    Objective:   Pt seen for clinical swallow and cognitive communication evaluations. Upon entrance into room pt talking on phone. Promptly ended phone call. She is AAOx4, very pleasant and cooperative. Reports mental status has returned to baseline.     Cognitive Status:    Arousal/Alertness Appropriate response to stimuli  Attention No obvious deficits observed .  Orientation Oriented x4  Memory WFL  Simple calculation --WFL      Receptive Language:   Comprehension:      WFL    Pragmatics:    WFL    Expressive Language:  Verbal:    WFL      Motor Speech:  WFL    Voice:   WFL    Visual-Spatial:  WFL    Reading:   DNT. Reports no trouble reading emails/texts     Written Expression:   DNT    Oral Musculature Evaluation  · Oral Musculature: WFL (slight L labial weakness at rest)  · Dentition: present and adequate  · Secretion Management:  (single episode of anterior loss of secretions on L. Pt indicated intact sensation)  · Mucosal Quality: adequate  · Mandibular Strength and Mobility: WFL  · Oral Labial Strength and Mobility: WFL  · Lingual Strength and Mobility: WFL  · Velar Elevation: WFL  · Buccal Strength and Mobility: WFL  · Volitional Cough: adequate  · Volitional Swallow: able to palpate larygneal rise  · Voice Prior to PO Intake: clear    Bedside Swallow Eval:   Consistencies Assessed:  · Thin liquids --via cup and straw  · Puree --appelsauce  · Mixed consistencies --diced peaches  · Solids --renan cracker     Oral Phase:   · WFL    Pharyngeal Phase: "   · coughing/choking    Compensatory Strategies  · None    Treatment: MoCA (Version 8.1) administered with pt achieving a score of 28/30 suggesting cognitive-linguistic skills WFL.  Pt earned 4 of 5 points on visuospatial/executive functions, 3 of 3 points on naming, 6 of 6 points for attention, 3 of 3 points for language, 2 of 2 points for abstraction, 4 of 5 points for delayed recall and 6 of 6 points for orientation.      Pt/family educated re: results/recs of evaluation. Receptive to information provided.     Assessment:   Clinical swallowing and cognitive communication evaluations completed. Cognitive linguistic skills judged to be WFL. All po trials tolerated with no overt s/s swallow dysfunction. REC Regular textures with thin liquids. No f/u indicated ATT. Please re consult PRN.     Plan:     · Patient to be seen:      · Plan of Care expires:     · Plan of Care reviewed with:  patient   · SLP Follow-Up:  No       Discharge recommendations:  Discharge Facility/Level of Care Needs: home   Barriers to Discharge:  None    Time Tracking:     SLP Treatment Date:   02/22/22  Speech Start Time:  0919  Speech Stop Time:  0938     Speech Total Time (min):  19 min    Billable Minutes: Eval 11 , Treatment Swallowing Dysfunction 8 and Total Time 19    02/22/2022

## 2022-02-22 NOTE — PLAN OF CARE
Pt sitting in bed visiting with family. Left side facial droop residual right ischemia. Pt passed swallow study. Fall precautions in place. Call light in reach. Endorsed pt to oncoming nurse.

## 2022-02-22 NOTE — SIGNIFICANT EVENT
[facility] Medicine Rapid Response Note      Admit Date: 2/21/2022  LOS: 0  Code Status: Full Code   CC: Thrombotic stroke involving right middle cerebral artery  Date of Consult: 02/21/2022  RRT Indication(s):  Rash and itching after CT with contrast  Attending Physician: Bryan Marquez MD  Primary Service: Networked reference to record PCT     SUBJECTIVE:     Interval history:  66-year-old female admitted today for right MCA CVA.  Called to bedside to evaluate patient with diffuse erythematous rash with itching after having CTA of the brain.      OBJECTIVE:     Vital Signs (Most Recent):  Temp: 98 °F (36.7 °C) (02/21/22 2000)  Pulse: 89 (02/21/22 2000)  Resp: 18 (02/21/22 2000)  BP: 116/79 (02/21/22 2000)  SpO2: 97 % (02/21/22 2000) Vital Signs (24h Range):  Temp:  [97.8 °F (36.6 °C)-98.4 °F (36.9 °C)] 98 °F (36.7 °C)  Pulse:  [74-90] 89  Resp:  [16-19] 18  SpO2:  [95 %-98 %] 97 %  BP: (116-198)/() 116/79     I & O (24h):  No intake or output data in the 24 hours ending 02/21/22 2921     Physical Exam: Physical Exam   Awake alert oriented person place time situation  Lungs equal and clear bilateral auscultation  Heart irregularly irregular rhythm of 170  Skin:  Erythematous rash diffusely to torso neck, face, and extremities  No airway compromise     Lines/Drains/Airway:           Nutrition:  Current Diet Order   Procedures    Diet Adult Regular (IDDSI Level 7)         Labs/Imaging-   All pertinent labs within the past 24 hours have been reviewed.  CBC:   Recent Labs   Lab 02/21/22  0853   WBC 9.57   HGB 13.4   HCT 42.3        CMP:   Recent Labs   Lab 02/21/22  0853      K 4.0      CO2 23   *   BUN 21   CREATININE 0.9   CALCIUM 10.8*   PROT 7.4   ALBUMIN 3.7   BILITOT 0.5   ALKPHOS 81   AST 21   ALT 24   ANIONGAP 10   EGFRNONAA >60       Diagnostics/Interventions during Rapid response     Solu-Medrol 125  Pepcid 20 IV  Benadryl 25 mg IV  EKG  Diltiazem 10 mg IV push  Diltiazem  infusion at 10 mg/hour      ASSESSMENT/PLAN:     Active Hospital Problems    Diagnosis    *Thrombotic stroke involving right middle cerebral artery              Uninterrupted Critical Care time (not including procedures): 45

## 2022-02-22 NOTE — PROGRESS NOTES
"Daily Progress Note  [facility]      Tameka Hoffman (MRN: [id number])  Admitting Service: Hospital Medicine  Date of Admission: 2/21/2022   Primary Care Provider: Primary Doctor No    ?  Chief complaint: left sided weakness, dysarthria    ?  History of Present Illness:     66-year-old female with history of hypertension presents with 1 day of left-sided weakness, imbalance and dysarthria found to have an acute CVA.    The patient went to bed without symptoms around midnight.  She awoke in the middle the morning around 530. At this point she recognized that she was having difficulty ambulating due to weakness on the left and difficulty with balance.  When attempting to speak she noticed she had difficulty saying words even though she did have a problem finding words.    At the time of the interview weakness had significantly improved.  Dysarthria had also significantly improved such that she was able to fully communicate.    ?    Subjective    Lightheaded when hypotensive after dilt last night.     No CP, SOB, lightheadedness now.     Tolerating oral intake.     ?  PHYSICAL EXAM  Vitals: BP (!) 142/66 (BP Location: Left arm, Patient Position: Sitting)   Pulse 88   Temp 96.1 °F (35.6 °C) (Oral)   Resp 18   Ht 5' 4" (1.626 m)   Wt 102.1 kg (225 lb)   SpO2 96%   BMI 38.62 kg/m²  Body mass index is 38.62 kg/m².    General: NAD, AO3  HEENT: PERRL, EOMI.   Cardiovascular: RRR (reverted)  Respiratory: lungs CTAB  GI: abdomen S/NT/ND, +BS  Extremities: no edema  MSK: moves upper extremities.   Neuro/Psych: A&OX3.  Prior right eye surgery.  Patient reports that her right eye droop is at baseline and that her pupils are known to be of different sizes.  4/5 strength on the left.  Full on the right.  Dysmetria present      EKG and radiographs from the past 24h: reviewed and personally interpreted if available.      LABS    Recent Labs     02/22/22  0451   WBC 14.49*   HGB 15.0        ?  Recent Labs     " 02/22/22  0451      K 4.0   CO2 21*   BUN 19   CREATININE 1.0   MG 2.0   PHOS 2.5*     ?  Recent Labs     02/21/22  0853   BILITOT 0.5   AST 21   ALT 24   ALKPHOS 81   PROT 7.4     ?  Recent Labs     02/21/22  0853   INR 0.9     ?    ASSESSMENT & PLAN    66-year-old female with history of hypertension presents with 1 day of left-sided weakness, imbalance and dysarthria found to have an acute CVA.    1. Acute CVA of the right MCA  -distal right M2 branch occlusion suggested by CTA  -deemed not a candidate for intervention  -will require BOB if TTE is unremarkable per Neurology (DOAC v. Coumadin based on presence of thrombus)  -full a/c  -A1c: 5.6  -LDL: 94  -MRI, echo, tele, carotids reviewed     2. Afib with RVR  -metoprolol initiated. Cardiology may transition to sotalol  -full a/c.     2. Hypertension  -holding home Norvasc as starting metoprolol    DVT prophylaxis:  Lovenox, full          Code Status/Dispo: Full Code.   ?  Bryan Marquez    Date: 2/22/2022  Time: 5:58 PM

## 2022-02-22 NOTE — CONSULTS
[facility]  Department of Cardiology  Consult Note      PATIENT NAME: Tameka Hoffman  MRN: [id number]  TODAY'S DATE: 02/22/2022  ADMIT DATE: 2/21/2022                          CONSULT REQUESTED BY: Bryan Marquez MD    SUBJECTIVE     PRINCIPAL PROBLEM: Thrombotic stroke involving right middle cerebral artery      REASON FOR CONSULT:  A flutter now in NSR     HPI:  66 year old F with a PMHx of HTN and one previous episode of atrial fibrillation who presented to the ED after an onset of L sided weakness, facial droop, and slurred speech. Her BP when EMS arrived was 220/198 she sates. Normally her pressure is well controlled with amlodipine 2.5. 5 years ago she went into a fib after taking too much rescue inhaler when she had a bout of pneumonia. She was successfully converted into NSR and did not go home with any medications or hospital follow up. She was not symptomatic for her a fib previously or last night. She has no other past medical history, her father did have heart disease. Her cholesterol is WNL, she is not a smoker or drinker. Her stroke symptoms have resolved.     Per H and P:  66-year-old female with history of hypertension presents with 1 day of left-sided weakness, imbalance and dysarthria found to have an acute CVA.     The patient went to bed without symptoms around midnight.  She awoke in the middle the morning around 530. At this point she recognized that she was having difficulty ambulating due to weakness on the left and difficulty with balance.  When attempting to speak she noticed she had difficulty saying words even though she did have a problem finding words.     At the time of the interview weakness had significantly improved.  Dysarthria had also significantly improved such that she was able to fully communicate.      Review of patient's allergies indicates:   Allergen Reactions    Iodinated contrast media Swelling     Complete body swelling and  rash    Poison ivy extract         No past medical history on file.  No past surgical history on file.        REVIEW OF SYSTEMS  See HPI     OBJECTIVE     VITAL SIGNS (Most Recent)  Temp: 96.1 °F (35.6 °C) (02/22/22 1116)  Pulse: 88 (02/22/22 1116)  Resp: 18 (02/22/22 1116)  BP: (!) 142/66 (02/22/22 1116)  SpO2: 96 % (02/22/22 1116)    VENTILATION STATUS  Resp: 18 (02/22/22 1116)  SpO2: 96 % (02/22/22 1116)       I & O (Last 24H):No intake or output data in the 24 hours ending 02/22/22 1151    WEIGHTS  Wt Readings from Last 3 Encounters:   02/21/22 1743 102.1 kg (225 lb)   02/21/22 0803 102.1 kg (225 lb)       PHYSICAL EXAM  GENERAL: well built, well nourished, well-developed in no apparent distress alert and oriented.   HEENT: Normocephalic. Pupils normal and conjunctivae normal.  Mucous membranes normal, no cyanosis or icterus, trachea central,no pallor or icterus is noted..   NECK: No JVD. No bruit..   CARDIAC: Regular rate and rhythm. S1 is normal.S2 is normal.No gallops, clicks or murmurs noted at this time.  CHEST ANATOMY: normal.   LUNGS: Clear to auscultation. No wheezing or rhonchi..   ABDOMEN: Obese   URINARY: No rock catheter   EXTREMITIES: No cyanosis, clubbing or edema noted at this time., no calf tenderness bilaterally.   PERIPHERAL VASCULAR SYSTEM: Good palpable distal pulses.   CENTRAL NERVOUS SYSTEM: No focal motor or sensory deficits noted.   SKIN: Skin without lesions, moist, well perfused.   MUSCLE STRENGTH & TONE: No noteable weakness, atrophy or abnormal movement.     HOME MEDICATIONS:  No current facility-administered medications on file prior to encounter.     Current Outpatient Medications on File Prior to Encounter   Medication Sig Dispense Refill    amLODIPine (NORVASC) 2.5 MG tablet Take 2.5 mg by mouth once daily.      biotin/calcium carbonate (BIOTIN 100+10 ORAL) Take 1 tablet by mouth Daily.      brimonidine-timoloL (COMBIGAN) 0.2-0.5 % Drop Place 1 drop into the right eye once daily.      cetirizine  (ZYRTEC) 10 MG tablet Take 10 mg by mouth once daily.      ketorolac 0.5% (ACULAR) 0.5 % Drop Place 1 drop into the right eye Daily.      vit A/vit C/vit E/zinc/copper (PRESERVISION AREDS ORAL) Take 1 tablet by mouth Daily.      zinc ascorbate/multivitamin (MULTIVITAMIN-ZINC ASCORBATE ORAL) Take 1 tablet by mouth Daily.         SCHEDULED MEDS:   atorvastatin  80 mg Oral Daily    enoxaparin  1 mg/kg Subcutaneous Q12H    metoprolol tartrate  12.5 mg Oral BID       CONTINUOUS INFUSIONS:    PRN MEDS:acetaminophen, labetalol, ondansetron, sodium chloride 0.9%    LABS AND DIAGNOSTICS     CBC LAST 3 DAYS  Recent Labs   Lab 02/21/22  0853 02/22/22  0451   WBC 9.57 14.49*   RBC 4.62 5.08   HGB 13.4 15.0   HCT 42.3 45.7   MCV 92 90   MCH 29.0 29.5   MCHC 31.7* 32.8   RDW 12.7 12.8    345   MPV 10.2 9.7   GRAN 67.6  6.5 93.1*  13.5*   LYMPH 21.6  2.1 5.6*  0.8*   MONO 6.3  0.6 0.5*  0.1*   BASO 0.07 0.03   NRBC 0 0       COAGULATION LAST 3 DAYS  Recent Labs   Lab 02/21/22  0853   INR 0.9       CHEMISTRY LAST 3 DAYS  Recent Labs   Lab 02/21/22  0853 02/22/22  0451    140   K 4.0 4.0    107   CO2 23 21*   ANIONGAP 10 12   BUN 21 19   CREATININE 0.9 1.0   * 191*   CALCIUM 10.8* 9.5   MG  --  2.0   ALBUMIN 3.7  --    PROT 7.4  --    ALKPHOS 81  --    ALT 24  --    AST 21  --    BILITOT 0.5  --        CARDIAC PROFILE LAST 3 DAYS  Recent Labs   Lab 02/22/22  0451   CPK 62   CPKMB 4.5       ENDOCRINE LAST 3 DAYS  Recent Labs   Lab 02/21/22  0853   TSH 1.716       LAST ARTERIAL BLOOD GAS  ABG  No results for input(s): PH, PO2, PCO2, HCO3, BE in the last 168 hours.    LAST 7 DAYS MICROBIOLOGY   Microbiology Results (last 7 days)     ** No results found for the last 168 hours. **          MOST RECENT IMAGING  MRI BRAIN WITHOUT CONTRAST  Narrative: EXAMINATION:  MRI BRAIN WITHOUT CONTRAST    CLINICAL HISTORY:  Stroke;    TECHNIQUE:  Multiplanar multisequence MR imaging of the brain was performed  without contrast.    COMPARISON:  None.    FINDINGS:  A T1 hypointensity is noted within the right cerebellar hemisphere suggestive a region of gliosis.  The ventricles and sulci appear age-appropriate.  Some mild periventricular white matter changes are noted suggestive of chronic microvascular ischemic change.  No gradient susceptibility is noted to suggest the presence of acute blood products.    Some diffusion positivity is noted along the sylvian fissure on the right along the cortex anteriorly in particular suggestive a region of infarction.  Mild increased FLAIR signal is noted in this region.  The region of diffusion positivity axially appears to span approximately 3 x 3 cm  Impression: Diffusion positivity along the sylvian fissure on the right suggestive of a recent or acute region of ischemia/infarction    Electronically signed by: Tereso Liriano MD  Date:    02/21/2022  Time:    13:29  US Carotid Bilateral  Narrative: EXAMINATION:  US CAROTID BILATERAL    CLINICAL HISTORY:  stroke;    TECHNIQUE:  Grayscale and color Doppler ultrasound examination of the carotid and vertebral artery systems bilaterally.  Stenosis estimates are per the NASCET measurement criteria.    COMPARISON:  None.    FINDINGS:  Right:    Internal Carotid Artery (ICA) peak systolic velocity 114 cm/sec    ICA/CCA peak systolic velocity ratio: 1.5    Plaque formation: Homogeneous    Vertebral artery: Antegrade flow and normal waveform.    Left:    Internal Carotid Artery (ICA)  peak systolic velocity 85 cm/sec    ICA/CCA peak systolic velocity ratio: 1.1    Plaque formation: Homogeneous    Vertebral artery: Antegrade flow and normal waveform.  Impression: No evidence of a hemodynamically significant carotid bifurcation stenosis.    Electronically signed by: Leonard Young  Date:    02/21/2022  Time:    11:15  CTA Head and Neck (xpd)  Narrative: EXAMINATION:  CTA HEAD AND NECK (XPD)    CLINICAL HISTORY:  Neuro deficit, acute, stroke  suspected;    TECHNIQUE:  CT angiogram was performed from the level of the ritu to the top of the head following the IV administration of 75mL of Omnipaque 350.   Sagittal and coronal reconstructions and maximum intensity projection reconstructions were performed. Arterial stenosis percentages are based on NASCET measurement criteria.    COMPARISON:  Same-day head CT    FINDINGS:  Subtle loss of gray-white differentiation in the posterior right frontal lobe.  Normal size of the ventricular system.  Mild mucosal thickening in the ethmoid sinuses.  Mild mucosal thickening right maxillary sinus.  No mass or abnormal enhancement along the aero digestive tract.  Glandular tissue in the neck unremarkable.  No cervical adenopathy.  Some motion limits assessment of fine detail in the lung apices.  There is a calcified granuloma in the left upper lobe and at the right lung apex there is a 3 mm nodule series 2, image 89.  Mild multilevel degenerative changes in the spine.    There is a bovine arch configuration.  Right subclavian artery is patent.  Left subclavian artery demonstrates a short segment of approximately 50% stenosis near the thoracic inlet likely due to extrinsic muscular compression of (axial series 2, image 142.  Right common carotid artery is patent.  There is plaque along the right carotid bulb with less than 50% stenosis.  Right ICA is patent.  Left common carotid artery is patent.  There is plaque along the left carotid bulb with less than 50% stenosis.  Left ICA is patent.  There is a filling defect involving a distal right M2 branch extending over a 9 mm distance near the sylvian fissure.  This can be seen on axial series 2, image 416-431 and coronal series 601, image 42.  There is some reconstitution of flow distal to this segment.  Left MCA and bilateral ACAs are patent.  There is an anterior communicating artery.    Bilateral vertebral arteries are patent.  Patient is Co vertebral artery dominant.   The basilar artery and bilateral PCAs are patent.  There are bilateral posterior communicating arteries.  Impression: 1. Filling defect in a distal right M2 branch correlating to an area of infarct/ischemia in the right frontal lobe.  2. Right pulmonary nodule.  In a low risk patient, no further follow-up is recommended.  In a high-risk patient, 12 month follow-up CT could be considered.  Findings were discussed with Dr. Cook by telephone at 09:50 hours.    Electronically signed by: Leonard Young  Date:    02/21/2022  Time:    10:06  CT Head Without Contrast  Narrative: EXAMINATION:  CT HEAD WITHOUT CONTRAST    CLINICAL HISTORY:  Neuro deficit, acute, stroke suspected;    TECHNIQUE:  Low dose axial images were obtained through the head.  Coronal and sagittal reformations were also performed. Contrast was not administered.    COMPARISON:  None.    FINDINGS:  There are areas of subtle loss of gray-white differentiation in the right frontal region suspicious for an acute or early subacute infarct.  There is no intracranial hemorrhage.  No associated mass effect.  The ventricles are nondilated.  Impression: Findings suspicious for an early right MCA territory ischemic infarct.  No intracranial hemorrhage.  Consider further evaluation with CTA and/or MRI.    COMMUNICATION  This critical result was discovered/received at 08:30.  The critical information above was relayed directly by me by telephone to Dr. Artur Cook on 02/21/2022 at 08:37.    Electronically signed by: oMe Huertas MD  Date:    02/21/2022  Time:    08:42      ECHOCARDIOGRAM RESULTS (last 5)  No results found for this or any previous visit.      CURRENT/PREVIOUS VISIT EKG  No results found for this or any previous visit.        ASSESSMENT/PLAN:     Active Hospital Problems    Diagnosis    *Thrombotic stroke involving right middle cerebral artery       ASSESSMENT & PLAN:   1. A flutter   - seemingly went into a flutter after reaction to CT  contrast last night   - now in NSR after IV push of cardizem   - continue anticoagulation with Lovenox, will need DOAC upon discharge    - will obtain saline bubble study, pending results will start sotalol 40 mg BID     2. Essential HTN   - controlled with metoprolol at present   - was elevated at 198/106 on admission     3. Acute ischemic CVA   - neurology on board   - recommending BOB if TTE unremarkable       RECOMMENDATIONS:  Echo bubble study   Consider sotalol 40 mg BID   Eliquis 5 mg BID on discharge   BOB pending TTE results       Clarissa Solano PA-C  [facility]  Department of Cardiology  Date of Service: 02/22/2022        I have personally interviewed and examined the patient, I have reviewed the Physician Assistant's history and physical, assessment, and plan. I agree with the findings and plan.      Michael Truong M.D.  [facility]  Department of Cardiology  Date of Service: 02/22/2022  11:51 AM

## 2022-02-22 NOTE — PT/OT/SLP EVAL
Occupational Therapy   Evaluation and Discharge Note    Name: Tameka Hoffman  MRN: [id number]  Admitting Diagnosis:  Thrombotic stroke involving right middle cerebral artery   Recent Surgery: * No surgery found *      Recommendations:     Discharge Recommendations: home  Discharge Equipment Recommendations:  none  Barriers to discharge:  None    Assessment:     Tameka Hoffman is a 66 y.o. female with a medical diagnosis of Thrombotic stroke involving right middle cerebral artery. At this time, patient is functioning at their prior level of function and does not require further acute OT services.     Plan:     During this hospitalization, patient does not require further acute OT services.  Please re-consult if situation changes.    · Plan of Care Reviewed with: patient    Subjective     Chief Complaint: None  Patient/Family Comments/goals: To take a shower once spouse arrives with toiletries and clothing    Occupational Profile:  Living Environment: Lives with spouse in single story home with walk-in shower   Previous level of function: Independent  Equipment Used at home:  none  Assistance upon Discharge: Spouse    Pain/Comfort:  · Pain Rating 1: 0/10    Patients cultural, spiritual, Jehovah's witness conflicts given the current situation: no    Objective:     Communicated with: Froylan Dinh prior to session.  Patient found HOB elevated with bed alarm, oxygen, telemetry upon OT entry to room.    General Precautions: Standard, fall   Orthopedic Precautions:N/A   Braces: N/A  Respiratory Status: Nasal cannula     Occupational Performance:    Bed Mobility:    · Patient completed Supine to Sit with independence and supervision  · Patient completed Sit to Supine with independence and supervision    Functional Mobility/Transfers:  · Patient completed Sit <> Stand Transfer with independence and supervision  with  no assistive device   · Functional Mobility: Independent/Supervision to ambulate in room without use of assistive  device and no loss of balance.    Activities of Daily Living:  · Feeding:  independence and supervision    · Grooming: independence and supervision    · Upper Body Dressing: independence and supervision to don/doff gowns front/back  · Lower Body Dressing: independence and supervision    · Toileting: independence and supervision      Cognitive/Visual Perceptual:  Oriented x 3; pleasan/tcooperative; follows multi-step commands, and safety awareness/insight intact    Physical Exam:  Balance:    -       Static/dynamic sit balance is NORMAL; static/dynamic standing balance is GOOD  Dominant hand:    -       Right  Upper Extremity Range of Motion:  -       Right Upper Extremity: WNL  -       Left Upper Extremity: WNL  Upper Extremity Strength:    -       Right Upper Extremity: WNL  -       Left Upper Extremity: WNL   Strength:    -       Right Upper Extremity: WNL  -       Left Upper Extremity: WNL  Fine Motor Coordination:    -       Intact  Left hand, finger to nose, Right hand, finger to nose, Left hand thumb/finger opposition skills, Right hand thumb/finger opposition skills, Left hand, manipulation of objects and Right hand, manipulation of objects  Gross motor coordination:   WNL Bilateral upper extremity    AMPAC 6 Click ADL:  AMPAC Total Score: 24    Treatment & Education:  - OTR providing education/instruction regarding OT role/purpose, safety awareness/fall prevention including use of call button and bed/chair alarms - pt verbalizes/demonstrates understanding and in agreement  - Patient reports she is at functional baseline and baseline mobility - skilled OT services not indicated and patient verbalizes understanding/in agreement with discontinuation of OT services/order at this time.  Education:    Patient left HOB elevated with all lines intact, call button in reach and bed alarm on    GOALS:   Multidisciplinary Problems     Occupational Therapy Goals     Not on file                History:     No past  medical history on file.    No past surgical history on file.    Time Tracking:     OT Date of Treatment: 02/22/22  OT Start Time: 0941  OT Stop Time: 0949  OT Total Time (min): 8 min    Billable Minutes:Evaluation 8    2/22/2022

## 2022-02-23 LAB
ANION GAP SERPL CALC-SCNC: 11 MMOL/L (ref 8–16)
BASOPHILS # BLD AUTO: 0.02 K/UL (ref 0–0.2)
BASOPHILS NFR BLD: 0.1 % (ref 0–1.9)
BUN SERPL-MCNC: 21 MG/DL (ref 8–23)
CALCIUM SERPL-MCNC: 9.5 MG/DL (ref 8.7–10.5)
CHLORIDE SERPL-SCNC: 106 MMOL/L (ref 95–110)
CO2 SERPL-SCNC: 22 MMOL/L (ref 23–29)
CREAT SERPL-MCNC: 0.9 MG/DL (ref 0.5–1.4)
DIFFERENTIAL METHOD: ABNORMAL
EOSINOPHIL # BLD AUTO: 0.3 K/UL (ref 0–0.5)
EOSINOPHIL NFR BLD: 1.8 % (ref 0–8)
ERYTHROCYTE [DISTWIDTH] IN BLOOD BY AUTOMATED COUNT: 13 % (ref 11.5–14.5)
EST. GFR  (AFRICAN AMERICAN): >60 ML/MIN/1.73 M^2
EST. GFR  (NON AFRICAN AMERICAN): >60 ML/MIN/1.73 M^2
GLUCOSE SERPL-MCNC: 122 MG/DL (ref 70–110)
HCT VFR BLD AUTO: 41.2 % (ref 37–48.5)
HGB BLD-MCNC: 13.6 G/DL (ref 12–16)
IMM GRANULOCYTES # BLD AUTO: 0.07 K/UL (ref 0–0.04)
IMM GRANULOCYTES NFR BLD AUTO: 0.4 % (ref 0–0.5)
LYMPHOCYTES # BLD AUTO: 2.4 K/UL (ref 1–4.8)
LYMPHOCYTES NFR BLD: 14.3 % (ref 18–48)
MAGNESIUM SERPL-MCNC: 2.1 MG/DL (ref 1.6–2.6)
MCH RBC QN AUTO: 29.8 PG (ref 27–31)
MCHC RBC AUTO-ENTMCNC: 33 G/DL (ref 32–36)
MCV RBC AUTO: 90 FL (ref 82–98)
MONOCYTES # BLD AUTO: 0.5 K/UL (ref 0.3–1)
MONOCYTES NFR BLD: 3.1 % (ref 4–15)
NEUTROPHILS # BLD AUTO: 13.7 K/UL (ref 1.8–7.7)
NEUTROPHILS NFR BLD: 80.3 % (ref 38–73)
NRBC BLD-RTO: 0 /100 WBC
PLATELET # BLD AUTO: 322 K/UL (ref 150–450)
PMV BLD AUTO: 9.5 FL (ref 9.2–12.9)
POTASSIUM SERPL-SCNC: 4.2 MMOL/L (ref 3.5–5.1)
RBC # BLD AUTO: 4.57 M/UL (ref 4–5.4)
SODIUM SERPL-SCNC: 139 MMOL/L (ref 136–145)
WBC # BLD AUTO: 17.03 K/UL (ref 3.9–12.7)

## 2022-02-23 PROCEDURE — 99233 SBSQ HOSP IP/OBS HIGH 50: CPT | Mod: ,,,

## 2022-02-23 PROCEDURE — 85025 COMPLETE CBC W/AUTO DIFF WBC: CPT | Performed by: INTERNAL MEDICINE

## 2022-02-23 PROCEDURE — 94761 N-INVAS EAR/PLS OXIMETRY MLT: CPT

## 2022-02-23 PROCEDURE — 80048 BASIC METABOLIC PNL TOTAL CA: CPT | Performed by: INTERNAL MEDICINE

## 2022-02-23 PROCEDURE — 25000003 PHARM REV CODE 250

## 2022-02-23 PROCEDURE — 36415 COLL VENOUS BLD VENIPUNCTURE: CPT | Performed by: INTERNAL MEDICINE

## 2022-02-23 PROCEDURE — 99233 PR SUBSEQUENT HOSPITAL CARE,LEVL III: ICD-10-PCS | Mod: ,,,

## 2022-02-23 PROCEDURE — 12000002 HC ACUTE/MED SURGE SEMI-PRIVATE ROOM

## 2022-02-23 PROCEDURE — 25000003 PHARM REV CODE 250: Performed by: EMERGENCY MEDICINE

## 2022-02-23 PROCEDURE — 83735 ASSAY OF MAGNESIUM: CPT | Performed by: INTERNAL MEDICINE

## 2022-02-23 PROCEDURE — 63600175 PHARM REV CODE 636 W HCPCS: Performed by: INTERNAL MEDICINE

## 2022-02-23 RX ORDER — SODIUM CHLORIDE 9 MG/ML
INJECTION, SOLUTION INTRAVENOUS ONCE
Status: CANCELLED | OUTPATIENT
Start: 2022-02-23

## 2022-02-23 RX ORDER — SOTALOL HYDROCHLORIDE 80 MG/1
40 TABLET ORAL 2 TIMES DAILY
Status: DISCONTINUED | OUTPATIENT
Start: 2022-02-23 | End: 2022-02-24 | Stop reason: HOSPADM

## 2022-02-23 RX ADMIN — ENOXAPARIN SODIUM 100 MG: 100 INJECTION SUBCUTANEOUS at 09:02

## 2022-02-23 RX ADMIN — SOTALOL HYDROCHLORIDE 40 MG: 80 TABLET ORAL at 01:02

## 2022-02-23 RX ADMIN — ATORVASTATIN CALCIUM 80 MG: 40 TABLET, FILM COATED ORAL at 09:02

## 2022-02-23 NOTE — PROGRESS NOTES
[facility]  Department of Cardiology  Progress Note    PATIENT NAME: Tameka Hoffman  MRN: [id number]  TODAY'S DATE: 02/23/2022  ADMIT DATE: 2/21/2022    SUBJECTIVE     PRINCIPLE PROBLEM: Thrombotic stroke involving right middle cerebral artery    INTERVAL HISTORY:    2/23/2022  Patient feeling great this morning.  Still experiencing erythema from allergic reaction to contrast, however it has subsided.  No chest pain or shortness of breath.  Has maintained normal sinus rhythm.  Did have an episode of bradycardia overnight however this is asymptomatic.  BP is stable.     Review of patient's allergies indicates:   Allergen Reactions    Iodinated contrast media Swelling     Complete body swelling and  rash    Poison ivy extract        REVIEW OF SYSTEMS  CARDIOVASCULAR: No recent chest pain, palpitations, arm, neck, or jaw pain  RESPIRATORY: No recent fever, cough chills, SOB or congestion  : No blood in the urine  GI: No Nausea, vomiting, constipation, diarrhea, blood, or reflux.  MUSCULOSKELETAL: No myalgias  NEURO: No lightheadedness or dizziness  EYES: No Double vision, blurry, vision or headache     OBJECTIVE     VITAL SIGNS (Most Recent)  Temp: 96.1 °F (35.6 °C) (02/23/22 1119)  Pulse: 68 (02/23/22 1119)  Resp: 18 (02/23/22 1119)  BP: 138/63 (02/23/22 1119)  SpO2: 98 % (02/23/22 1119)    VENTILATION STATUS  Resp: 18 (02/23/22 1119)  SpO2: 98 % (02/23/22 1119)       I & O (Last 24H):No intake or output data in the 24 hours ending 02/23/22 1158    WEIGHTS  Wt Readings from Last 3 Encounters:   02/22/22 1116 102.1 kg (225 lb)   02/21/22 1743 102.1 kg (225 lb)   02/21/22 0803 102.1 kg (225 lb)       PHYSICAL EXAM  CONSTITUTIONAL: Well built, well nourished in no apparent distress  NECK: no carotid bruit, no JVD  LUNGS: CTA  CHEST WALL: no tenderness  HEART: regular rate and rhythm, S1, S2 normal, no murmur, click, rub or gallop   ABDOMEN: soft, non-tender; bowel sounds normal; no masses,  no  organomegaly  EXTREMITIES: Extremities normal, no edema  NEURO: AAO X 3    SCHEDULED MEDS:   atorvastatin  80 mg Oral Daily    enoxaparin  1 mg/kg Subcutaneous Q12H    sotalol  40 mg Oral BID       CONTINUOUS INFUSIONS:    PRN MEDS:acetaminophen, labetalol, ondansetron, sodium chloride 0.9%    LABS AND DIAGNOSTICS     CBC LAST 3 DAYS  Recent Labs   Lab 02/21/22  0853 02/22/22  0451 02/23/22  0627   WBC 9.57 14.49* 17.03*   RBC 4.62 5.08 4.57   HGB 13.4 15.0 13.6   HCT 42.3 45.7 41.2   MCV 92 90 90   MCH 29.0 29.5 29.8   MCHC 31.7* 32.8 33.0   RDW 12.7 12.8 13.0    345 322   MPV 10.2 9.7 9.5   GRAN 67.6  6.5 93.1*  13.5* 80.3*  13.7*   LYMPH 21.6  2.1 5.6*  0.8* 14.3*  2.4   MONO 6.3  0.6 0.5*  0.1* 3.1*  0.5   BASO 0.07 0.03 0.02   NRBC 0 0 0       COAGULATION LAST 3 DAYS  Recent Labs   Lab 02/21/22  0853   INR 0.9       CHEMISTRY LAST 3 DAYS  Recent Labs   Lab 02/21/22  0853 02/22/22  0451 02/23/22  0627    140 139   K 4.0 4.0 4.2    107 106   CO2 23 21* 22*   ANIONGAP 10 12 11   BUN 21 19 21   CREATININE 0.9 1.0 0.9   * 191* 122*   CALCIUM 10.8* 9.5 9.5   MG  --  2.0 2.1   ALBUMIN 3.7  --   --    PROT 7.4  --   --    ALKPHOS 81  --   --    ALT 24  --   --    AST 21  --   --    BILITOT 0.5  --   --        CARDIAC PROFILE LAST 3 DAYS  Recent Labs   Lab 02/22/22  0451   CPK 62   CPKMB 4.5       ENDOCRINE LAST 3 DAYS  Recent Labs   Lab 02/21/22  0853   TSH 1.716       LAST ARTERIAL BLOOD GAS  ABG  No results for input(s): PH, PO2, PCO2, HCO3, BE in the last 168 hours.    LAST 7 DAYS MICROBIOLOGY   Microbiology Results (last 7 days)     ** No results found for the last 168 hours. **          MOST RECENT IMAGING  Echo  · The estimated ejection fraction is 63%.  · Normal left ventricular diastolic function.  · The left ventricle is normal in size with normal systolic function.  · Normal right ventricular size with normal right ventricular systolic   function.  · There is no evidence  of intracardiac shunting.  · Mild pulmonic regurgitation.  · Normal central venous pressure (3 mmHg).  · Bubble study negative for PFO         LASTECHO  Results for orders placed during the hospital encounter of 02/21/22    Echo    Interpretation Summary  · The estimated ejection fraction is 63%.  · Normal left ventricular diastolic function.  · The left ventricle is normal in size with normal systolic function.  · Normal right ventricular size with normal right ventricular systolic function.  · There is no evidence of intracardiac shunting.  · Mild pulmonic regurgitation.  · Normal central venous pressure (3 mmHg).  · Bubble study negative for PFO      CURRENT/PREVIOUS VISIT EKG  Results for orders placed or performed during the hospital encounter of 02/21/22   EKG 12-lead    Collection Time: 02/22/22  1:56 AM    Narrative    Test Reason : I48.91,    Vent. Rate : 074 BPM     Atrial Rate : 074 BPM     P-R Int : 156 ms          QRS Dur : 080 ms      QT Int : 422 ms       P-R-T Axes : 057 028 055 degrees     QTc Int : 468 ms    Normal sinus rhythm  Septal infarct (cited on or before 21-FEB-2022)  Abnormal ECG  When compared with ECG of 21-FEB-2022 23:15,  Sinus rhythm has replaced Atrial flutter  Vent. rate has decreased BY  56 BPM  Confirmed by Alexi QUINTERO, Michael SHEFFIELD ([id number]) on 2/22/2022 9:36:45 PM    Referred By: YARY   SELF           Confirmed By:Michael Truong MD       ASSESSMENT/PLAN:     Active Hospital Problems    Diagnosis    *Thrombotic stroke involving right middle cerebral artery       ASSESSMENT & PLAN:   1. A flutter   - seemingly went into a flutter after reaction to CT contrast last night   - now in NSR after IV push of cardizem   - continue anticoagulation with Lovenox, will need DOAC upon discharge    - start sotalol 40 mg BID      2. Essential HTN   - consider starting low dose losartan if BP rises off of lopressor   - was elevated at 198/106 on admission      3. Acute ischemic CVA   - neurology  on board   - TTE unremarkable  - NPO after midnight for BOB tomorrow morning         RECOMMENDATIONS:  NPO after midnight for BOB in the a.m.  Start sotalol 40 mg BID   Eliquis 5 mg BID on discharge       Clarissa Solano PA-C  [facility]  Department of Cardiology  Date of Service: 02/23/2022      I have personally interviewed and examined the patient.  I have reviewed all the Physician Assistant's documentation, and agree with the plan.       Thomas Sterling M.D.  [facility]  Department of Cardiology  Date of Service: 02/23/2022  11:58 AM

## 2022-02-23 NOTE — CARE UPDATE
02/22/22 1925   Patient Assessment/Suction   Level of Consciousness (AVPU) alert   Respiratory Effort Unlabored;Normal   Expansion/Accessory Muscles/Retractions no use of accessory muscles;no retractions;expansion symmetric   Rhythm/Pattern, Respiratory unlabored;pattern regular;depth regular   PRE-TX-O2   O2 Device (Oxygen Therapy) room air   SpO2 95 %   Pulse Oximetry Type Intermittent   $ Pulse Oximetry - Multiple Charge Pulse Oximetry - Multiple   Pulse 86   Resp 18

## 2022-02-23 NOTE — PROGRESS NOTES
[facility]  Progress Note  Date: 2022 9:38 AM            Patient Name: Tameka Hoffman   MRN: [id number]   : 1955    AGE: 66 y.o.    LOS: 2 days Hospital Day: 3  Admit date: 2022  8:02 AM     HPI: Tameka Hoffman is a 66 y.o. female with a history of hypertension, presents to the ER for left-sided weakness, gait imbalance which she noticed when she woke up this morning.     Patient states that she went to bed around 12:00 a.m. and she was normal at that time.  Woke up around 5:30 a.m. and noticed that she was weak on left upper and lower extremities and was not able to walk.  She presented to the ER for these symptoms.  In the ER, she had a tele stroke evaluation and had NIH stroke scale was 6. CT head showed early ischemic changes in the right MCA territory.  She was not a candidate for tPA as she was outside treatment window.  CT angiogram head and neck showed a filling defect in the distal right M2 branch.  Patient was not a candidate for intervention per tele stroke due to her distal occlusion.     I have seen the patient in the ER.  On exam, she had a left facial droop, she had a chronic right eye ptosis and mild left-sided weakness.  NIH stroke scale was 3 on my exam.     Patient is being admitted for further stroke workup and management.     She denies any history of stroke in the past, she is compliant with antihypertensive medications.  She is not on aspirin, Plavix or any anticoagulation at home.  She is not a smoker.    2022: Overnight, patient went into AFib with RVR. Patient was seen and examined by me this morning. Neuro exam improved.  She has no significant facial droop and her weakness on the left side has improved as well.    2022:  Patient was seen examined by me this morning.  Overnight she had episode of bradycardia where her heart rate went into 40s and 50s.  No new symptoms.  She feels back to herself.  Her  is at bedside and I  "discussed treatment plan with the patient and her .       Vitals:  Patient Vitals for the past 24 hrs:   BP Temp Temp src Pulse Resp SpO2 Height Weight   02/23/22 0737 (!) 109/56 97.7 °F (36.5 °C) Oral (!) 59 16 97 % -- --   02/23/22 0423 137/68 97.9 °F (36.6 °C) Oral 61 17 95 % -- --   02/23/22 0403 (!) 141/64 97.3 °F (36.3 °C) Oral 60 18 96 % -- --   02/23/22 0023 123/71 97.8 °F (36.6 °C) Oral 66 17 95 % -- --   02/23/22 0001 126/66 97.5 °F (36.4 °C) Oral 67 18 96 % -- --   02/22/22 2023 117/70 97.8 °F (36.6 °C) Oral 64 18 95 % -- --   02/22/22 2004 121/69 97.9 °F (36.6 °C) Oral 61 17 96 % -- --   02/22/22 1925 -- -- -- 86 18 95 % -- --   02/22/22 1516 (!) 121/58 96.4 °F (35.8 °C) Oral 70 19 98 % -- --   02/22/22 1116 (!) 142/66 96.1 °F (35.6 °C) Oral 88 18 96 % 5' 4" (1.626 m) 102.1 kg (225 lb)     PHYSICAL EXAM:     GENERAL APPEARANCE: Well-developed, well-nourished female in no acute distress.  HEENT: Normocephalic and atraumatic. PERRL. Oropharynx unremarkable.  PULM: Comfortable on room air.  CV: RRR.  ABDOMEN: Soft, nontender.  EXTREMITIES: No signs of vascular compromise. Pulses present. No cyanosis, clubbing or edema.  SKIN: Clear; no rashes, lesions or skin breaks in exposed areas.      NEURO:   MENTAL STATUS: Patient awake and oriented to time, place, and person. Affect normal.  CRANIAL NERVES II-XII: Pupils equal, round and reactive to light. Extraocular movements full and intact. No facial asymmetry.  Right eye ptosis which is chronic  MOTOR: Normal bulk. Tone normal and symmetrical throughout.  No abnormal movements. No tremor.   Strength 5/5 throughout unless specified below.  REFLEXES: DTRs 2+; normal and symmetric throughout.   SENSATION: Sensation grossly intact to fine touch.  COORDINATION: Finger-to-nose normal for age and symmetric.  STATION: Romberg deferred.  GAIT: Deferred.    CURRENT SCHEDULED MEDICATIONS:   atorvastatin  80 mg Oral Daily    enoxaparin  1 mg/kg Subcutaneous Q12H "    metoprolol tartrate  12.5 mg Oral BID     CURRENT INFUSIONS:    DATA:  Recent Labs   Lab 02/21/22  0853 02/22/22  0451 02/23/22  0627    140 139   K 4.0 4.0 4.2    107 106   CO2 23 21* 22*   BUN 21 19 21   CREATININE 0.9 1.0 0.9   * 191* 122*   CALCIUM 10.8* 9.5 9.5   PHOS  --  2.5*  --    MG  --  2.0 2.1   AST 21  --   --    ALT 24  --   --      Recent Labs   Lab 02/21/22  0853 02/22/22  0451 02/23/22  0627   WBC 9.57 14.49* 17.03*   HGB 13.4 15.0 13.6   HCT 42.3 45.7 41.2    345 322     No results found for: PROTEINCSF, GLUCCSF, WBCCSF, RBCCSF  Hemoglobin A1C   Date Value Ref Range Status   02/22/2022 5.6 4.0 - 5.6 % Final     Comment:     ADA Screening Guidelines:  5.7-6.4%  Consistent with prediabetes  >or=6.5%  Consistent with diabetes    High levels of fetal hemoglobin interfere with the HbA1C  assay. Heterozygous hemoglobin variants (HbS, HgC, etc)do  not significantly interfere with this assay.   However, presence of multiple variants may affect accuracy.              I have personally reviewed and interpreted the pertinent imaging and lab results.  Imaging Results          US Carotid Bilateral (Final result)  Result time 02/21/22 11:15:49    Final result by Leonard Young MD (02/21/22 11:15:49)                 Impression:      No evidence of a hemodynamically significant carotid bifurcation stenosis.      Electronically signed by: Leonard Young  Date:    02/21/2022  Time:    11:15             Narrative:    EXAMINATION:  US CAROTID BILATERAL    CLINICAL HISTORY:  stroke;    TECHNIQUE:  Grayscale and color Doppler ultrasound examination of the carotid and vertebral artery systems bilaterally.  Stenosis estimates are per the NASCET measurement criteria.    COMPARISON:  None.    FINDINGS:  Right:    Internal Carotid Artery (ICA) peak systolic velocity 114 cm/sec    ICA/CCA peak systolic velocity ratio: 1.5    Plaque formation: Homogeneous    Vertebral artery: Antegrade flow  and normal waveform.    Left:    Internal Carotid Artery (ICA)  peak systolic velocity 85 cm/sec    ICA/CCA peak systolic velocity ratio: 1.1    Plaque formation: Homogeneous    Vertebral artery: Antegrade flow and normal waveform.                               MRI BRAIN WITHOUT CONTRAST (Final result)  Result time 02/21/22 13:29:37    Final result by Tereso Liriano IV, MD (02/21/22 13:29:37)                 Impression:      Diffusion positivity along the sylvian fissure on the right suggestive of a recent or acute region of ischemia/infarction      Electronically signed by: Tereso Liriano MD  Date:    02/21/2022  Time:    13:29             Narrative:    EXAMINATION:  MRI BRAIN WITHOUT CONTRAST    CLINICAL HISTORY:  Stroke;    TECHNIQUE:  Multiplanar multisequence MR imaging of the brain was performed without contrast.    COMPARISON:  None.    FINDINGS:  A T1 hypointensity is noted within the right cerebellar hemisphere suggestive a region of gliosis.  The ventricles and sulci appear age-appropriate.  Some mild periventricular white matter changes are noted suggestive of chronic microvascular ischemic change.  No gradient susceptibility is noted to suggest the presence of acute blood products.    Some diffusion positivity is noted along the sylvian fissure on the right along the cortex anteriorly in particular suggestive a region of infarction.  Mild increased FLAIR signal is noted in this region.  The region of diffusion positivity axially appears to span approximately 3 x 3 cm                               CTA Head and Neck (xpd) (Final result)  Result time 02/21/22 10:06:23    Final result by Leonard Young MD (02/21/22 10:06:23)                 Impression:      1. Filling defect in a distal right M2 branch correlating to an area of infarct/ischemia in the right frontal lobe.  2. Right pulmonary nodule.  In a low risk patient, no further follow-up is recommended.  In a high-risk patient, 12 month follow-up  CT could be considered.  Findings were discussed with Dr. Cook by telephone at 09:50 hours.      Electronically signed by: Leonard Young  Date:    02/21/2022  Time:    10:06             Narrative:    EXAMINATION:  CTA HEAD AND NECK (XPD)    CLINICAL HISTORY:  Neuro deficit, acute, stroke suspected;    TECHNIQUE:  CT angiogram was performed from the level of the ritu to the top of the head following the IV administration of 75mL of Omnipaque 350.   Sagittal and coronal reconstructions and maximum intensity projection reconstructions were performed. Arterial stenosis percentages are based on NASCET measurement criteria.    COMPARISON:  Same-day head CT    FINDINGS:  Subtle loss of gray-white differentiation in the posterior right frontal lobe.  Normal size of the ventricular system.  Mild mucosal thickening in the ethmoid sinuses.  Mild mucosal thickening right maxillary sinus.  No mass or abnormal enhancement along the aero digestive tract.  Glandular tissue in the neck unremarkable.  No cervical adenopathy.  Some motion limits assessment of fine detail in the lung apices.  There is a calcified granuloma in the left upper lobe and at the right lung apex there is a 3 mm nodule series 2, image 89.  Mild multilevel degenerative changes in the spine.    There is a bovine arch configuration.  Right subclavian artery is patent.  Left subclavian artery demonstrates a short segment of approximately 50% stenosis near the thoracic inlet likely due to extrinsic muscular compression of (axial series 2, image 142.  Right common carotid artery is patent.  There is plaque along the right carotid bulb with less than 50% stenosis.  Right ICA is patent.  Left common carotid artery is patent.  There is plaque along the left carotid bulb with less than 50% stenosis.  Left ICA is patent.  There is a filling defect involving a distal right M2 branch extending over a 9 mm distance near the sylvian fissure.  This can be seen on axial  series 2, image 416-431 and coronal series 601, image 42.  There is some reconstitution of flow distal to this segment.  Left MCA and bilateral ACAs are patent.  There is an anterior communicating artery.    Bilateral vertebral arteries are patent.  Patient is Co vertebral artery dominant.  The basilar artery and bilateral PCAs are patent.  There are bilateral posterior communicating arteries.                               CT Head Without Contrast (Final result)  Result time 02/21/22 08:42:50    Final result by Moe Huertas MD (02/21/22 08:42:50)                 Impression:      Findings suspicious for an early right MCA territory ischemic infarct.  No intracranial hemorrhage.  Consider further evaluation with CTA and/or MRI.    COMMUNICATION  This critical result was discovered/received at 08:30.  The critical information above was relayed directly by me by telephone to Dr. Artur Cook on 02/21/2022 at 08:37.      Electronically signed by: Moe Huertas MD  Date:    02/21/2022  Time:    08:42             Narrative:    EXAMINATION:  CT HEAD WITHOUT CONTRAST    CLINICAL HISTORY:  Neuro deficit, acute, stroke suspected;    TECHNIQUE:  Low dose axial images were obtained through the head.  Coronal and sagittal reformations were also performed. Contrast was not administered.    COMPARISON:  None.    FINDINGS:  There are areas of subtle loss of gray-white differentiation in the right frontal region suspicious for an acute or early subacute infarct.  There is no intracranial hemorrhage.  No associated mass effect.  The ventricles are nondilated.                                        ASSESSMENT AND PLAN:    Acute ischemic right MCA stroke  Hypertension  Atrial fibrillation     Etiology of right MCA stroke is likely cardioembolic in the setting of new diagnosis of AFib.     Workup  · CTH:  Areas of loss of gray-white differentiation in the right frontal region suspicious for acute to early subacute ischemic  infarct.  No intracranial hemorrhage  · CTA head and neck:  Distal right M2 filling defect which could be an occlusion.  · MRI brain: Acute ischemic R MCA stroke in the right frontal and temporal lobe  · ECHO:   EF 63%, no PFO, normal left atrium  · LDL: 94.6   · HbA1c: 5.6           Plan  · Admitted for further stroke workup  · Patient is found to be in atrial fibrillation with RVR and etiology of stroke is likely cardioembolic in setting of atrial fibrillation.  Will recommend starting anticoagulation and can use direct oral anticoagulants unless echocardiogram had to show an intracardiac thrombus.  · No antiplatelet therapy after starting anticoagulation  · Permissive BP to 220 systolic for 48 hrs from symptom onset and after that normalize BP  · If TTE is negative, patient will need a BOB to rule out intracardiac thrombus  · PT OT  · Speech therapy  · DVT prophylaxis with chemo/SCD prophylaxis  · Extensively discussed lifestyle modifications as prophylactic measures for stroke prevention including, adequate blood pressure management, healthy diet and regular exercise.          37 minutes of care time has been spent evaluating with the patient. Time includes chart review not limited to diagnostic imaging, labs, and vitals, patient assessment, discussion with family and nursing, current order evaluations, and new order entries.      Lane Brock MD  Neurology/vascular Neurology  Date of Service: 02/23/2022  9:38 AM    Please note: This note was transcribed using voice recognition software. Because of this technology there are often uinintended grammatical, spelling, and other transcription errors. Please disregard these errors.

## 2022-02-23 NOTE — PROGRESS NOTES
Pt takes all meds per order. Tele box 8656 in place per order. NSR all night. VSS. Sleeps t/o night. VTE Lovenox given per order. Neuro checks complete per order. Pt seems to have no deficits noted from stroke and is back to baseline. Eager to go home. Verbalizes understanding of POC. Pt ambulates around unit with , who stays at bedside t/o night. Safety measures in place. No distress noted. No reportable overnight events.

## 2022-02-23 NOTE — PLAN OF CARE
Pt resting in bed no s/s of distress. Family at bedside. Pt had an uneventful day call light in reach. Fall precautions in place endorsed to oncoming nurse.

## 2022-02-23 NOTE — CARE UPDATE
02/23/22 0730   Patient Assessment/Suction   Level of Consciousness (AVPU) alert   Rhythm/Pattern, Respiratory unlabored   PRE-TX-O2   O2 Device (Oxygen Therapy) room air   SpO2 98 %   Pulse Oximetry Type Intermittent   $ Pulse Oximetry - Multiple Charge Pulse Oximetry - Multiple   Pulse 65   Resp 18

## 2022-02-23 NOTE — PROGRESS NOTES
CM called patient pharmacy in regards to out of pocket cost for Eliquis. Patient states total cost for patient is $47.      [id number] - [place] - [address] AT [facility]  [address] [place] [address]  Phone: [phone] Fax: [phone]

## 2022-02-23 NOTE — PLAN OF CARE
Problem: Infection  Goal: Absence of Infection Signs and Symptoms  Outcome: Ongoing, Progressing     Problem: Cerebral Tissue Perfusion (Stroke, Ischemic/Transient Ischemic Attack)  Goal: Optimal Cerebral Tissue Perfusion  Outcome: Ongoing, Progressing

## 2022-02-24 ENCOUNTER — ANESTHESIA EVENT (OUTPATIENT)
Dept: CARDIOLOGY | Facility: HOSPITAL | Age: 67
DRG: 065 | End: 2022-02-24
Payer: MEDICARE

## 2022-02-24 ENCOUNTER — ANESTHESIA (OUTPATIENT)
Dept: CARDIOLOGY | Facility: HOSPITAL | Age: 67
DRG: 065 | End: 2022-02-24
Payer: MEDICARE

## 2022-02-24 VITALS
BODY MASS INDEX: 38.41 KG/M2 | DIASTOLIC BLOOD PRESSURE: 60 MMHG | SYSTOLIC BLOOD PRESSURE: 122 MMHG | HEART RATE: 57 BPM | OXYGEN SATURATION: 99 % | HEIGHT: 64 IN | TEMPERATURE: 98 F | RESPIRATION RATE: 20 BRPM | WEIGHT: 225 LBS

## 2022-02-24 PROBLEM — I10 ESSENTIAL HYPERTENSION: Status: ACTIVE | Noted: 2022-02-24

## 2022-02-24 PROBLEM — I48.0 PAROXYSMAL ATRIAL FIBRILLATION: Status: ACTIVE | Noted: 2022-02-24

## 2022-02-24 LAB
ANION GAP SERPL CALC-SCNC: 10 MMOL/L (ref 8–16)
BASOPHILS # BLD AUTO: 0.05 K/UL (ref 0–0.2)
BASOPHILS NFR BLD: 0.4 % (ref 0–1.9)
BSA FOR ECHO PROCEDURE: 2.15 M2
BUN SERPL-MCNC: 22 MG/DL (ref 8–23)
CALCIUM SERPL-MCNC: 8.9 MG/DL (ref 8.7–10.5)
CHLORIDE SERPL-SCNC: 107 MMOL/L (ref 95–110)
CO2 SERPL-SCNC: 25 MMOL/L (ref 23–29)
CREAT SERPL-MCNC: 1 MG/DL (ref 0.5–1.4)
DIFFERENTIAL METHOD: ABNORMAL
EJECTION FRACTION: 65 %
EOSINOPHIL # BLD AUTO: 1.4 K/UL (ref 0–0.5)
EOSINOPHIL NFR BLD: 11.6 % (ref 0–8)
ERYTHROCYTE [DISTWIDTH] IN BLOOD BY AUTOMATED COUNT: 13.1 % (ref 11.5–14.5)
EST. GFR  (AFRICAN AMERICAN): >60 ML/MIN/1.73 M^2
EST. GFR  (NON AFRICAN AMERICAN): 59 ML/MIN/1.73 M^2
GLUCOSE SERPL-MCNC: 87 MG/DL (ref 70–110)
HCT VFR BLD AUTO: 38.9 % (ref 37–48.5)
HGB BLD-MCNC: 12.5 G/DL (ref 12–16)
IMM GRANULOCYTES # BLD AUTO: 0.08 K/UL (ref 0–0.04)
IMM GRANULOCYTES NFR BLD AUTO: 0.7 % (ref 0–0.5)
LYMPHOCYTES # BLD AUTO: 5.1 K/UL (ref 1–4.8)
LYMPHOCYTES NFR BLD: 41.3 % (ref 18–48)
MAGNESIUM SERPL-MCNC: 2.2 MG/DL (ref 1.6–2.6)
MCH RBC QN AUTO: 30 PG (ref 27–31)
MCHC RBC AUTO-ENTMCNC: 32.1 G/DL (ref 32–36)
MCV RBC AUTO: 93 FL (ref 82–98)
MONOCYTES # BLD AUTO: 0.8 K/UL (ref 0.3–1)
MONOCYTES NFR BLD: 6.3 % (ref 4–15)
NEUTROPHILS # BLD AUTO: 4.9 K/UL (ref 1.8–7.7)
NEUTROPHILS NFR BLD: 39.7 % (ref 38–73)
NRBC BLD-RTO: 0 /100 WBC
PHOSPHATE SERPL-MCNC: 3.8 MG/DL (ref 2.7–4.5)
PLATELET # BLD AUTO: 288 K/UL (ref 150–450)
PMV BLD AUTO: 9.7 FL (ref 9.2–12.9)
POTASSIUM SERPL-SCNC: 4.7 MMOL/L (ref 3.5–5.1)
RA PRESSURE: 3 MMHG
RBC # BLD AUTO: 4.17 M/UL (ref 4–5.4)
SODIUM SERPL-SCNC: 142 MMOL/L (ref 136–145)
WBC # BLD AUTO: 12.24 K/UL (ref 3.9–12.7)

## 2022-02-24 PROCEDURE — D9220A PRA ANESTHESIA: ICD-10-PCS | Mod: ANES,,, | Performed by: ANESTHESIOLOGY

## 2022-02-24 PROCEDURE — 36415 COLL VENOUS BLD VENIPUNCTURE: CPT | Performed by: INTERNAL MEDICINE

## 2022-02-24 PROCEDURE — 63600175 PHARM REV CODE 636 W HCPCS: Performed by: NURSE ANESTHETIST, CERTIFIED REGISTERED

## 2022-02-24 PROCEDURE — D9220A PRA ANESTHESIA: Mod: ANES,,, | Performed by: ANESTHESIOLOGY

## 2022-02-24 PROCEDURE — 80048 BASIC METABOLIC PNL TOTAL CA: CPT | Performed by: INTERNAL MEDICINE

## 2022-02-24 PROCEDURE — 94761 N-INVAS EAR/PLS OXIMETRY MLT: CPT

## 2022-02-24 PROCEDURE — 85025 COMPLETE CBC W/AUTO DIFF WBC: CPT | Performed by: INTERNAL MEDICINE

## 2022-02-24 PROCEDURE — 99233 PR SUBSEQUENT HOSPITAL CARE,LEVL III: ICD-10-PCS | Mod: ,,, | Performed by: SPECIALIST

## 2022-02-24 PROCEDURE — 83735 ASSAY OF MAGNESIUM: CPT | Performed by: INTERNAL MEDICINE

## 2022-02-24 PROCEDURE — D9220A PRA ANESTHESIA: Mod: CRNA,,, | Performed by: NURSE ANESTHETIST, CERTIFIED REGISTERED

## 2022-02-24 PROCEDURE — 37000008 HC ANESTHESIA 1ST 15 MINUTES: Performed by: SPECIALIST

## 2022-02-24 PROCEDURE — 84100 ASSAY OF PHOSPHORUS: CPT | Performed by: INTERNAL MEDICINE

## 2022-02-24 PROCEDURE — 25000003 PHARM REV CODE 250: Performed by: NURSE ANESTHETIST, CERTIFIED REGISTERED

## 2022-02-24 PROCEDURE — 37000009 HC ANESTHESIA EA ADD 15 MINS: Performed by: SPECIALIST

## 2022-02-24 PROCEDURE — D9220A PRA ANESTHESIA: ICD-10-PCS | Mod: CRNA,,, | Performed by: NURSE ANESTHETIST, CERTIFIED REGISTERED

## 2022-02-24 PROCEDURE — 99233 SBSQ HOSP IP/OBS HIGH 50: CPT | Mod: ,,, | Performed by: SPECIALIST

## 2022-02-24 RX ORDER — DILTIAZEM HYDROCHLORIDE 180 MG/1
180 CAPSULE, COATED, EXTENDED RELEASE ORAL DAILY
Qty: 30 CAPSULE | Refills: 3 | Status: SHIPPED | OUTPATIENT
Start: 2022-02-24 | End: 2022-04-07

## 2022-02-24 RX ORDER — LIDOCAINE HCL/PF 100 MG/5ML
SYRINGE (ML) INTRAVENOUS
Status: DISCONTINUED | OUTPATIENT
Start: 2022-02-24 | End: 2022-02-24

## 2022-02-24 RX ORDER — ASPIRIN 81 MG/1
81 TABLET ORAL DAILY
Refills: 0 | COMMUNITY
Start: 2022-02-24 | End: 2022-04-07

## 2022-02-24 RX ORDER — PROPOFOL 10 MG/ML
VIAL (ML) INTRAVENOUS
Status: DISCONTINUED | OUTPATIENT
Start: 2022-02-24 | End: 2022-02-24

## 2022-02-24 RX ORDER — PROPOFOL 10 MG/ML
INJECTION, EMULSION INTRAVENOUS
Status: COMPLETED
Start: 2022-02-24 | End: 2022-02-24

## 2022-02-24 RX ORDER — ATORVASTATIN CALCIUM 80 MG/1
80 TABLET, FILM COATED ORAL DAILY
Qty: 30 TABLET | Refills: 3 | Status: SHIPPED | OUTPATIENT
Start: 2022-02-24 | End: 2022-06-24

## 2022-02-24 RX ORDER — LIDOCAINE HYDROCHLORIDE 20 MG/ML
INJECTION, SOLUTION EPIDURAL; INFILTRATION; INTRACAUDAL; PERINEURAL
Status: DISCONTINUED
Start: 2022-02-24 | End: 2022-02-24 | Stop reason: HOSPADM

## 2022-02-24 RX ORDER — SOTALOL HYDROCHLORIDE 80 MG/1
40 TABLET ORAL 2 TIMES DAILY
Qty: 30 TABLET | Refills: 3 | Status: SHIPPED | OUTPATIENT
Start: 2022-02-24 | End: 2022-02-24 | Stop reason: HOSPADM

## 2022-02-24 RX ADMIN — PROPOFOL 120 MG: 10 INJECTION, EMULSION INTRAVENOUS at 11:02

## 2022-02-24 RX ADMIN — LIDOCAINE HYDROCHLORIDE 100 MG: 20 INJECTION INTRAVENOUS at 11:02

## 2022-02-24 RX ADMIN — PROPOFOL 20 MG: 10 INJECTION, EMULSION INTRAVENOUS at 11:02

## 2022-02-24 RX ADMIN — SODIUM CHLORIDE: 0.9 INJECTION, SOLUTION INTRAVENOUS at 11:02

## 2022-02-24 NOTE — PROGRESS NOTES
PM dose of heart med held r/t dimitrios in 40's and 50's. Other VSS. No distress noted. Pt ambulates around unit with no complaints of sob, no c/o dizziness, and no c/o cp.

## 2022-02-24 NOTE — ANESTHESIA POSTPROCEDURE EVALUATION
Anesthesia Post Evaluation    Patient: Tameka Hoffman    Procedure(s) Performed: Procedure(s) (LRB):  Transesophageal echo (BOB) intra-procedure log documentation (N/A)    Final Anesthesia Type: general      Patient location during evaluation: PACU  Patient participation: Yes- Able to Participate  Level of consciousness: awake and alert  Post-procedure vital signs: reviewed and stable  Pain management: adequate  Airway patency: patent    PONV status at discharge: No PONV  Anesthetic complications: no      Cardiovascular status: hemodynamically stable  Respiratory status: unassisted and room air  Hydration status: euvolemic  Follow-up not needed.            No case tracking events are documented in the log.      Pain/Martinez Score: No data recorded

## 2022-02-24 NOTE — ANESTHESIA PREPROCEDURE EVALUATION
02/24/2022  Tameka Hoffman is a 66 y.o., female.      Pre-op Assessment    I have reviewed the Patient Summary Reports.     I have reviewed the Nursing Notes. I have reviewed the NPO Status.   I have reviewed the Medications.     Review of Systems  Anesthesia Hx:  No problems with previous Anesthesia    Hematology/Oncology:  Hematology Normal   Oncology Normal     EENT/Dental:EENT/Dental Normal   Cardiovascular:   Hypertension Dysrhythmias atrial fibrillation    Pulmonary:  Pulmonary Normal    Renal/:  Renal/ Normal     Hepatic/GI:  Hepatic/GI Normal    Neurological:   CVA    Endocrine:  Obesity / BMI > 30      Physical Exam  General: Well nourished    Airway:  Mallampati: III   Mouth Opening: Normal  TM Distance: Normal  Tongue: Normal  Neck ROM: Normal ROM    Dental:  Intact    Chest/Lungs:  Clear to auscultation, Normal Respiratory Rate    Heart:  Rate: Normal  Rhythm: Regular Rhythm        Anesthesia Plan  Type of Anesthesia, risks & benefits discussed:    Anesthesia Type: Gen Natural Airway  Intra-op Monitoring Plan: Standard ASA Monitors  Induction:  IV  Informed Consent: Informed consent signed with the Patient and all parties understand the risks and agree with anesthesia plan.  All questions answered.   ASA Score: 3  Day of Surgery Review of History & Physical: H&P Update referred to the surgeon/provider.    Ready For Surgery From Anesthesia Perspective.     .

## 2022-02-24 NOTE — PROGRESS NOTES
Hospital follow up made with neurology, placed in AVS.    Hospital follow up made with cardiology, placed in AVS.

## 2022-02-24 NOTE — CONSULTS
[facility]  Cardiology  Progress Note    Patient Name: Tameka Hoffman  MRN: [id number]  Admission Date: 2/21/2022  Hospital Length of Stay: 3 days  Code Status: Full Code   Attending Physician: Gulshan Addison MD   Primary Care Physician: No primary care provider on file.  Expected Discharge Date: 2/24/2022  Principal Problem:Thrombotic stroke involving right middle cerebral artery    Subjective:     Hospital Course:  T negative for clot but there was lot of spontaneous echo contrast in the left atrium which can P precursor to clots  Interval History:  Bradycardia on sotalol low-dose  ROS  Objective:     Vital Signs (Most Recent):  Temp: 97.7 °F (36.5 °C) (02/24/22 1109)  Pulse: (!) 52 (02/24/22 1109)  Resp: 18 (02/24/22 1109)  BP: 129/67 (02/24/22 1151)  SpO2: 98 % (02/24/22 1109) Vital Signs (24h Range):  Temp:  [97.7 °F (36.5 °C)-98.6 °F (37 °C)] 97.7 °F (36.5 °C)  Pulse:  [49-71] 52  Resp:  [16-19] 18  SpO2:  [95 %-98 %] 98 %  BP: (112-154)/(56-76) 129/67     Weight: 102.1 kg (225 lb)  Body mass index is 38.62 kg/m².    SpO2: 98 %  O2 Device (Oxygen Therapy): nasal cannula      Intake/Output Summary (Last 24 hours) at 2/24/2022 1254  Last data filed at 2/24/2022 1143  Gross per 24 hour   Intake 250 ml   Output --   Net 250 ml       Lines/Drains/Airways     Peripheral Intravenous Line  Duration                Peripheral IV - Single Lumen 02/22/22 0723 20 G Anterior;Proximal;Right Forearm 2 days                Physical Exam lungs clear  Cardiac regular    Significant Labs:     Significant Imaging:   Assessment and Plan:   CVA possibly embolic in nature  Spontaneous echo contrast left atrium  DC on Eliquis and Cardizem and off of amlodipine  Follow-up in the office and proximally month will get event monitor  Brief HPI:  Active Diagnoses:    Diagnosis Date Noted POA    PRINCIPAL PROBLEM:  Thrombotic stroke involving right middle cerebral artery [I63.311] 02/21/2022 Yes    Paroxysmal atrial  fibrillation [I48.0] 02/24/2022 Yes    Essential hypertension [I10] 02/24/2022 Yes      Problems Resolved During this Admission:       VTE Risk Mitigation (From admission, onward)         Ordered     enoxaparin injection 100 mg  Every 12 hours (non-standard times)         02/22/22 0914     IP VTE HIGH RISK PATIENT  Once         02/21/22 0952     Place sequential compression device  Until discontinued         02/21/22 0952                Michael Truong MD  Cardiology  [facility]

## 2022-02-24 NOTE — TRANSFER OF CARE
"Anesthesia Transfer of Care Note    Patient: Tameka Hoffman    Procedure(s) Performed: Procedure(s) (LRB):  Transesophageal echo (BOB) intra-procedure log documentation (N/A)    Patient location: PACU    Anesthesia Type: general    Transport from OR: Transported from OR on 2-3 L/min O2 by NC with adequate spontaneous ventilation    Post pain: adequate analgesia    Post assessment: no apparent anesthetic complications and tolerated procedure well    Post vital signs: stable    Level of consciousness: awake, alert and oriented    Nausea/Vomiting: no nausea/vomiting    Complications: none    Transfer of care protocol was followed      Last vitals:   Visit Vitals  BP (!) 154/64   Pulse (!) 52   Temp 36.5 °C (97.7 °F) (Oral)   Resp 18   Ht 5' 4" (1.626 m)   Wt 102.1 kg (225 lb)   SpO2 98%   BMI 38.62 kg/m²     "

## 2022-02-24 NOTE — PLAN OF CARE
Pt sitting in bed aox4 no S/S of distress. Pt educated on meads to help control Afib sotalol added today dc metoprolol decrease in pt hr. Pt verbalized understanding. Pt informed npo after midnight Maxim Test tomorrow Md spoke with pt answered questions. Pt  at bed side. No events today. Pt call light in reach will continue to monitor for duration of shift and endorse to oncoming nurse.

## 2022-02-24 NOTE — PROGRESS NOTES
[facility]  Progress Note  Date: 2022 9:38 AM            Patient Name: Tameka Hoffman   MRN: [id number]   : 1955    AGE: 66 y.o.    LOS: 3 days Hospital Day: 4  Admit date: 2022  8:02 AM     HPI: Tameka Hfofman is a 66 y.o. female with a history of hypertension, presents to the ER for left-sided weakness, gait imbalance which she noticed when she woke up this morning.     Patient states that she went to bed around 12:00 a.m. and she was normal at that time.  Woke up around 5:30 a.m. and noticed that she was weak on left upper and lower extremities and was not able to walk.  She presented to the ER for these symptoms.  In the ER, she had a tele stroke evaluation and had NIH stroke scale was 6. CT head showed early ischemic changes in the right MCA territory.  She was not a candidate for tPA as she was outside treatment window.  CT angiogram head and neck showed a filling defect in the distal right M2 branch.  Patient was not a candidate for intervention per tele stroke due to her distal occlusion.     I have seen the patient in the ER.  On exam, she had a left facial droop, she had a chronic right eye ptosis and mild left-sided weakness.  NIH stroke scale was 3 on my exam.     Patient is being admitted for further stroke workup and management.     She denies any history of stroke in the past, she is compliant with antihypertensive medications.  She is not on aspirin, Plavix or any anticoagulation at home.  She is not a smoker.    2022: Overnight, patient went into AFib with RVR. Patient was seen and examined by me this morning. Neuro exam improved.  She has no significant facial droop and her weakness on the left side has improved as well.    2022:  Patient was seen examined by me this morning.  Overnight she had episode of bradycardia where her heart rate went into 40s and 50s.  No new symptoms.  She feels back to herself.  Her  is at bedside and I  discussed treatment plan with the patient and her .    2/24/2022: Patient was seen and examined by me this morning.  No acute overnight events, no new neurological complaints.  Vitals have been stable. BOB done         Vitals:  Patient Vitals for the past 24 hrs:   BP Temp Temp src Pulse Resp SpO2   02/24/22 0813 -- -- -- (!) 54 18 98 %   02/24/22 0803 129/67 98.5 °F (36.9 °C) Oral 60 18 97 %   02/24/22 0328 (!) 122/57 98.5 °F (36.9 °C) Oral (!) 53 19 96 %   02/23/22 2337 137/61 98.3 °F (36.8 °C) Oral (!) 59 19 95 %   02/23/22 2007 (!) 150/76 98 °F (36.7 °C) Oral (!) 49 17 97 %   02/23/22 1926 -- -- -- 71 18 98 %   02/23/22 1513 (!) 112/56 98.6 °F (37 °C) -- (!) 54 18 97 %   02/23/22 1348 137/60 -- -- 64 16 97 %   02/23/22 1119 138/63 96.1 °F (35.6 °C) Oral 68 18 98 %     PHYSICAL EXAM:     GENERAL APPEARANCE: Well-developed, well-nourished female in no acute distress.  HEENT: Normocephalic and atraumatic. PERRL. Oropharynx unremarkable.  PULM: Comfortable on room air.  CV: RRR.  ABDOMEN: Soft, nontender.  EXTREMITIES: No signs of vascular compromise. Pulses present. No cyanosis, clubbing or edema.  SKIN: Clear; no rashes, lesions or skin breaks in exposed areas.      NEURO:   MENTAL STATUS: Patient awake and oriented to time, place, and person. Affect normal.  CRANIAL NERVES II-XII: Pupils equal, round and reactive to light. Extraocular movements full and intact. No facial asymmetry.  Right eye ptosis which is chronic  MOTOR: Normal bulk. Tone normal and symmetrical throughout.  No abnormal movements. No tremor.   Strength 5/5 throughout unless specified below.  REFLEXES: DTRs 2+; normal and symmetric throughout.   SENSATION: Sensation grossly intact to fine touch.  COORDINATION: Finger-to-nose normal for age and symmetric.  STATION: Romberg deferred.  GAIT: Deferred.    CURRENT SCHEDULED MEDICATIONS:   atorvastatin  80 mg Oral Daily    enoxaparin  1 mg/kg Subcutaneous Q12H    sotalol  40 mg Oral BID      CURRENT INFUSIONS:    DATA:  Recent Labs   Lab 02/21/22  0853 02/22/22  0451 02/23/22  0627 02/24/22  0546    140 139 142   K 4.0 4.0 4.2 4.7    107 106 107   CO2 23 21* 22* 25   BUN 21 19 21 22   CREATININE 0.9 1.0 0.9 1.0   * 191* 122* 87   CALCIUM 10.8* 9.5 9.5 8.9   PHOS  --  2.5*  --  3.8   MG  --  2.0 2.1 2.2   AST 21  --   --   --    ALT 24  --   --   --      Recent Labs   Lab 02/21/22  0853 02/22/22  0451 02/23/22  0627 02/24/22  0546   WBC 9.57 14.49* 17.03* 12.24   HGB 13.4 15.0 13.6 12.5   HCT 42.3 45.7 41.2 38.9    345 322 288     No results found for: PROTEINCSF, GLUCCSF, WBCCSF, RBCCSF  Hemoglobin A1C   Date Value Ref Range Status   02/22/2022 5.6 4.0 - 5.6 % Final     Comment:     ADA Screening Guidelines:  5.7-6.4%  Consistent with prediabetes  >or=6.5%  Consistent with diabetes    High levels of fetal hemoglobin interfere with the HbA1C  assay. Heterozygous hemoglobin variants (HbS, HgC, etc)do  not significantly interfere with this assay.   However, presence of multiple variants may affect accuracy.              I have personally reviewed and interpreted the pertinent imaging and lab results.  Imaging Results          US Carotid Bilateral (Final result)  Result time 02/21/22 11:15:49    Final result by Leonard Young MD (02/21/22 11:15:49)                 Impression:      No evidence of a hemodynamically significant carotid bifurcation stenosis.      Electronically signed by: Leonard Young  Date:    02/21/2022  Time:    11:15             Narrative:    EXAMINATION:  US CAROTID BILATERAL    CLINICAL HISTORY:  stroke;    TECHNIQUE:  Grayscale and color Doppler ultrasound examination of the carotid and vertebral artery systems bilaterally.  Stenosis estimates are per the NASCET measurement criteria.    COMPARISON:  None.    FINDINGS:  Right:    Internal Carotid Artery (ICA) peak systolic velocity 114 cm/sec    ICA/CCA peak systolic velocity ratio: 1.5    Plaque  formation: Homogeneous    Vertebral artery: Antegrade flow and normal waveform.    Left:    Internal Carotid Artery (ICA)  peak systolic velocity 85 cm/sec    ICA/CCA peak systolic velocity ratio: 1.1    Plaque formation: Homogeneous    Vertebral artery: Antegrade flow and normal waveform.                               MRI BRAIN WITHOUT CONTRAST (Final result)  Result time 02/21/22 13:29:37    Final result by Tereso Liriano IV, MD (02/21/22 13:29:37)                 Impression:      Diffusion positivity along the sylvian fissure on the right suggestive of a recent or acute region of ischemia/infarction      Electronically signed by: Tereso Liriano MD  Date:    02/21/2022  Time:    13:29             Narrative:    EXAMINATION:  MRI BRAIN WITHOUT CONTRAST    CLINICAL HISTORY:  Stroke;    TECHNIQUE:  Multiplanar multisequence MR imaging of the brain was performed without contrast.    COMPARISON:  None.    FINDINGS:  A T1 hypointensity is noted within the right cerebellar hemisphere suggestive a region of gliosis.  The ventricles and sulci appear age-appropriate.  Some mild periventricular white matter changes are noted suggestive of chronic microvascular ischemic change.  No gradient susceptibility is noted to suggest the presence of acute blood products.    Some diffusion positivity is noted along the sylvian fissure on the right along the cortex anteriorly in particular suggestive a region of infarction.  Mild increased FLAIR signal is noted in this region.  The region of diffusion positivity axially appears to span approximately 3 x 3 cm                               CTA Head and Neck (xpd) (Final result)  Result time 02/21/22 10:06:23    Final result by Leonard Young MD (02/21/22 10:06:23)                 Impression:      1. Filling defect in a distal right M2 branch correlating to an area of infarct/ischemia in the right frontal lobe.  2. Right pulmonary nodule.  In a low risk patient, no further follow-up is  recommended.  In a high-risk patient, 12 month follow-up CT could be considered.  Findings were discussed with Dr. Cook by telephone at 09:50 hours.      Electronically signed by: Leonard Young  Date:    02/21/2022  Time:    10:06             Narrative:    EXAMINATION:  CTA HEAD AND NECK (XPD)    CLINICAL HISTORY:  Neuro deficit, acute, stroke suspected;    TECHNIQUE:  CT angiogram was performed from the level of the ritu to the top of the head following the IV administration of 75mL of Omnipaque 350.   Sagittal and coronal reconstructions and maximum intensity projection reconstructions were performed. Arterial stenosis percentages are based on NASCET measurement criteria.    COMPARISON:  Same-day head CT    FINDINGS:  Subtle loss of gray-white differentiation in the posterior right frontal lobe.  Normal size of the ventricular system.  Mild mucosal thickening in the ethmoid sinuses.  Mild mucosal thickening right maxillary sinus.  No mass or abnormal enhancement along the aero digestive tract.  Glandular tissue in the neck unremarkable.  No cervical adenopathy.  Some motion limits assessment of fine detail in the lung apices.  There is a calcified granuloma in the left upper lobe and at the right lung apex there is a 3 mm nodule series 2, image 89.  Mild multilevel degenerative changes in the spine.    There is a bovine arch configuration.  Right subclavian artery is patent.  Left subclavian artery demonstrates a short segment of approximately 50% stenosis near the thoracic inlet likely due to extrinsic muscular compression of (axial series 2, image 142.  Right common carotid artery is patent.  There is plaque along the right carotid bulb with less than 50% stenosis.  Right ICA is patent.  Left common carotid artery is patent.  There is plaque along the left carotid bulb with less than 50% stenosis.  Left ICA is patent.  There is a filling defect involving a distal right M2 branch extending over a 9 mm  distance near the sylvian fissure.  This can be seen on axial series 2, image 416-431 and coronal series 601, image 42.  There is some reconstitution of flow distal to this segment.  Left MCA and bilateral ACAs are patent.  There is an anterior communicating artery.    Bilateral vertebral arteries are patent.  Patient is Co vertebral artery dominant.  The basilar artery and bilateral PCAs are patent.  There are bilateral posterior communicating arteries.                               CT Head Without Contrast (Final result)  Result time 02/21/22 08:42:50    Final result by Moe Huertas MD (02/21/22 08:42:50)                 Impression:      Findings suspicious for an early right MCA territory ischemic infarct.  No intracranial hemorrhage.  Consider further evaluation with CTA and/or MRI.    COMMUNICATION  This critical result was discovered/received at 08:30.  The critical information above was relayed directly by me by telephone to Dr. Artur Cook on 02/21/2022 at 08:37.      Electronically signed by: Moe Huertas MD  Date:    02/21/2022  Time:    08:42             Narrative:    EXAMINATION:  CT HEAD WITHOUT CONTRAST    CLINICAL HISTORY:  Neuro deficit, acute, stroke suspected;    TECHNIQUE:  Low dose axial images were obtained through the head.  Coronal and sagittal reformations were also performed. Contrast was not administered.    COMPARISON:  None.    FINDINGS:  There are areas of subtle loss of gray-white differentiation in the right frontal region suspicious for an acute or early subacute infarct.  There is no intracranial hemorrhage.  No associated mass effect.  The ventricles are nondilated.                                        ASSESSMENT AND PLAN:    Acute ischemic right MCA stroke  Hypertension  Atrial fibrillation     Etiology of right MCA stroke is likely cardioembolic in the setting of new diagnosis of AFib.     Workup  · CTH:  Areas of loss of gray-white differentiation in the right  frontal region suspicious for acute to early subacute ischemic infarct.  No intracranial hemorrhage  · CTA head and neck:  Distal right M2 filling defect which could be an occlusion.  · MRI brain: Acute ischemic R MCA stroke in the right frontal and temporal lobe  · ECHO:   EF 63%, no PFO, normal left atrium  · BOB:  EF 65%, normal left atrial appendage, bubble study negative for PFO  · LDL: 94.6   · HbA1c: 5.6           Plan  · Admitted for further stroke workup  · Patient is found to be in atrial fibrillation with RVR and etiology of stroke is likely cardioembolic in setting of atrial fibrillation.    · Started on Eliquis 5 mg b.i.d..  · Continue Eliquis and Lipitor for secondary stroke prevention  · Normalize BP  · Extensively discussed lifestyle modifications as prophylactic measures for stroke prevention including, adequate blood pressure management, healthy diet and regular exercise.  · Patient is stable for discharge and follow up in Neurology Clinic.       [facility] at [phone] within 1-2 weeks days from discharge.     Stroke education was provided including stroke risk factors modification and any acute neurological changes including weakness, confusion, visual changes to come straight to the ER.     All questions were answered.                                     35 minutes of care time has been spent evaluating with the patient. Time includes chart review not limited to diagnostic imaging, labs, and vitals, patient assessment, discussion with family and nursing, current order evaluations, and new order entries.      Lane Brock MD  Neurology/vascular Neurology  Date of Service: 02/24/2022  9:38 AM    Please note: This note was transcribed using voice recognition software. Because of this technology there are often uinintended grammatical, spelling, and other transcription errors. Please disregard these errors.

## 2022-02-24 NOTE — CARE UPDATE
02/24/22 0813   Patient Assessment/Suction   Level of Consciousness (AVPU) alert   Rhythm/Pattern, Respiratory unlabored   PRE-TX-O2   O2 Device (Oxygen Therapy) room air   SpO2 98 %   Pulse Oximetry Type Intermittent   $ Pulse Oximetry - Multiple Charge Pulse Oximetry - Multiple   Pulse (!) 54   Resp 18

## 2022-02-24 NOTE — PLAN OF CARE
Discharged pt per MD order. Given d/c instruction and education on CVA with family at bedside. Pt transferred off unit with NAD noted. Belongings with pt.

## 2022-02-24 NOTE — PLAN OF CARE
Patient cleared for discharge home from .       02/24/22 1248   Final Note   Assessment Type Final Discharge Note   Anticipated Discharge Disposition Home

## 2022-02-24 NOTE — PROGRESS NOTES
"Daily Progress Note  [facility]      Tameka Hoffman (MRN: [id number])  Admitting Service: Hospital Medicine  Date of Admission: 2/21/2022   Primary Care Provider: Primary Doctor No    ?  Chief complaint: left sided weakness, dysarthria    ?  History of Present Illness:     66-year-old female with history of hypertension presents with 1 day of left-sided weakness, imbalance and dysarthria found to have an acute CVA.    The patient went to bed without symptoms around midnight.  She awoke in the middle the morning around 530. At this point she recognized that she was having difficulty ambulating due to weakness on the left and difficulty with balance.  When attempting to speak she noticed she had difficulty saying words even though she did have a problem finding words.    At the time of the interview weakness had significantly improved.  Dysarthria had also significantly improved such that she was able to fully communicate.    ?    Subjective      No CP, SOB, no lightheadedness now.     Tolerating oral intake.     Aphasia resolved. Denies weakness currently     ?  PHYSICAL EXAM  Vitals: BP (!) 112/56 (Patient Position: Lying)   Pulse (!) 54   Temp 98.6 °F (37 °C)   Resp 18   Ht 5' 4" (1.626 m)   Wt 102.1 kg (225 lb)   SpO2 97%   BMI 38.62 kg/m²  Body mass index is 38.62 kg/m².    General: NAD, AO3  HEENT: PERRL, EOMI.   Cardiovascular: RRR (reverted)  Respiratory: lungs CTAB  GI: abdomen S/NT/ND, +BS  Extremities: no edema  MSK: moves upper extremities.   Neuro/Psych: A&OX3.  Prior right eye surgery.  Patient reports that her right eye droop is at baseline and that her pupils are known to be of different sizes.  4/5 strength on the left.  Full on the right.  Dysmetria present      EKG and radiographs from the past 24h: reviewed and personally interpreted if available.      LABS    Recent Labs     02/23/22 0627   WBC 17.03*   HGB 13.6        ?  Recent Labs     02/22/22  0451 02/23/22 0627   NA " 140 139   K 4.0 4.2   CO2 21* 22*   BUN 19 21   CREATININE 1.0 0.9   MG 2.0 2.1   PHOS 2.5*  --      ?  Recent Labs     02/21/22  0853   BILITOT 0.5   AST 21   ALT 24   ALKPHOS 81   PROT 7.4     ?  Recent Labs     02/21/22  0853   INR 0.9     ?    ASSESSMENT & PLAN    66-year-old female with history of hypertension presents with 1 day of left-sided weakness, imbalance and dysarthria found to have an acute CVA.    1. Acute CVA of the right MCA  -distal right M2 branch occlusion suggested by CTA  -deemed not a candidate for intervention  -will require BOB per Neurology (DOAC v. Coumadin based on presence of thrombus)  -full a/c  -will ultimately require anti-platelet agents  -A1c: 5.6  -LDL: 94  -MRI, echo, tele, carotids reviewed     2. Afib with RVR  -sotalol initiated   -full a/c.     2. Hypertension  -holding home Norvasc as starting sotalol    DVT prophylaxis:  Lovenox, full          Code Status/Dispo: Full Code.   ?  Bryan Marquez    Date: 2/23/2022

## 2022-02-24 NOTE — CARE UPDATE
02/23/22 1926   Patient Assessment/Suction   Level of Consciousness (AVPU) alert   Respiratory Effort Normal;Unlabored   Expansion/Accessory Muscles/Retractions no retractions   Rhythm/Pattern, Respiratory no shortness of breath reported   Cough Frequency no cough   PRE-TX-O2   O2 Device (Oxygen Therapy) room air   SpO2 98 %   Pulse Oximetry Type Intermittent   $ Pulse Oximetry - Multiple Charge Pulse Oximetry - Multiple   Pulse 71   Resp 18   Positioning HOB elevated 30 degrees

## 2022-02-24 NOTE — PROGRESS NOTES
BOB procedure explained to patient and .  Pt transported to procedure room.  Consents obtained by doctors.  Monitors, O2 and IV fluids applied to pt.  Sedation and monitoring per anesthesia.  Probe introduced into mouth by doctor Alexi without difficulty.  Ultrasound pictures with bubble study obtained.  Probe removed and pt recovered for 30 minutes til back to pre procedure state.  Pt returned to room.  Report given.

## 2022-02-25 NOTE — HOSPITAL COURSE
Imaging confirmed that patient had suffered an acute right MCA ischemic stroke.  CTA showed occlusion of distal M2 segment.  Neurology service consulted with us.  The neurologic deficits resolved.  She developed atrial fibrillation while she was admitted.  Was seen by cardiology.  Started on DOAC and sotalol.  She had bradycardia.  The hospitalist discussed the slow heart rate with the cardiologist, and the recommendation was to substitute the sotalol with Cardizem  mg daily.   BOB was performed; showed no septal defect and no intraatrial thrombus.  Discharged on high-intensity statin, aspirin, and apixaban.    PHYSICAL EXAM  General: NAD, AO3  HEENT: PERRL, EOMI.   Cardiovascular: RRR (reverted)  Respiratory: lungs CTAB  GI: abdomen S/NT/ND, +BS

## 2022-02-25 NOTE — DISCHARGE SUMMARY
[facility] Medicine  Discharge Summary      Patient Name: Tameka Hoffman  MRN: [id number]  Patient Class: IP- Inpatient  Admission Date: 2/21/2022  Hospital Length of Stay: 3 days  Discharge Date and Time: 2/24/2022  3:45 PM  Attending Physician: No att. providers found   Discharging Provider: Gulshan Addison MD  Primary Care Provider: No primary care provider on file.      HPI:      66-year-old female with history of hypertension presents with 1 day of left-sided weakness, imbalance and dysarthria found to have an acute CVA.     The patient went to bed without symptoms around midnight.  She awoke in the middle the morning around 530. At this point she recognized that she was having difficulty ambulating due to weakness on the left and difficulty with balance.  When attempting to speak she noticed she had difficulty saying words even though she did have a problem finding words.     At the time of the interview weakness had significantly improved.  Dysarthria had also significantly improved such that she was able to fully communicate.        Procedure(s) (LRB):  Transesophageal echo (BOB) intra-procedure log documentation (N/A)      Hospital Course:   Imaging confirmed that patient had suffered an acute right MCA ischemic stroke.  CTA showed occlusion of distal M2 segment.  Neurology service consulted with us.  The neurologic deficits resolved.  She developed atrial fibrillation while she was admitted.  Was seen by cardiology.  Started on DOAC and sotalol.  She had bradycardia.  The hospitalist discussed the slow heart rate with the cardiologist, and the recommendation was to substitute the sotalol with Cardizem  mg daily.   BOB was performed; showed no septal defect and no intraatrial thrombus.  Discharged on high-intensity statin, aspirin, and apixaban.    PHYSICAL EXAM  General: NAD, AO3  HEENT: PERRL, EOMI.   Cardiovascular: RRR (reverted)  Respiratory: lungs CTAB  GI: abdomen  S/NT/ND, +BS       Goals of Care Treatment Preferences:  Code Status: Full Code      Consults:   Consults (From admission, onward)        Status Ordering Provider     Inpatient consult to Cardiology  Once        Provider:  Michael Truong MD    Completed ELIZ ESTRELLA     Inpatient consult to Neurology  Once        Provider:  Lane Brock MD    Completed ELIZ ESTRELLA     IP consult to case management/social work  Once        Provider:  (Not yet assigned)    Completed ELIZ ESTRELLA     Inpatient consult to Neurology  Once        Provider:  Lane Brock MD    Completed WILLIE BRASHER          No new Assessment & Plan notes have been filed under this hospital service since the last note was generated.  Service: Hospital Medicine    Final Active Diagnoses:    Diagnosis Date Noted POA    PRINCIPAL PROBLEM:  Thrombotic stroke involving right middle cerebral artery [I63.311] 02/21/2022 Yes    Paroxysmal atrial fibrillation [I48.0] 02/24/2022 Yes    Essential hypertension [I10] 02/24/2022 Yes      Problems Resolved During this Admission:       Discharged Condition: good    Disposition: Home or Self Care    Follow Up:   Follow-up Information     Lane Brock MD. Schedule an appointment as soon as possible for a visit on 3/10/2022.    Specialty: Neurology  Why: @ 9 AM  Contact information:  [address]  [place] [address]  [phone]             Michael Truong MD Follow up on 3/8/2022.    Specialties: Cardiology, Cardiovascular Disease  Why: @ 4 PM  Contact information:  [address]  [address]  [place] [address]  [phone]             KALEIGH Dexter MD Follow up.    Why: Please call office to make follow up appointment.  Contact information:  PCP                     Patient Instructions:      Diet Adult Regular     Order Specific Question Answer Comments   Additional restrictions: Low Chol/Sat Fat      Activity as tolerated       Significant Diagnostic Studies:   Lab Results    Component Value Date    WBC 12.24 02/24/2022    HGB 12.5 02/24/2022    HCT 38.9 02/24/2022    MCV 93 02/24/2022     02/24/2022       BMP  Lab Results   Component Value Date     02/24/2022    K 4.7 02/24/2022     02/24/2022    CO2 25 02/24/2022    BUN 22 02/24/2022    CREATININE 1.0 02/24/2022    CALCIUM 8.9 02/24/2022    ANIONGAP 10 02/24/2022    ESTGFRAFRICA >60 02/24/2022    EGFRNONAA 59 (A) 02/24/2022     LDL Cholesterol   Date Value Ref Range Status   02/21/2022 94.6 63.0 - 159.0 mg/dL Final     Comment:     The National Cholesterol Education Program (NCEP) has set the  following guidelines (reference values) for LDL Cholesterol:  Optimal.......................<130 mg/dL  Borderline High...............130-159 mg/dL  High..........................160-189 mg/dL  Very High.....................>190 mg/dL         Pending Diagnostic Studies:     None         Medications:  Reconciled Home Medications:      Medication List      START taking these medications    apixaban 5 mg Tab  Commonly known as: ELIQUIS  Take 1 tablet (5 mg total) by mouth 2 (two) times daily.     aspirin 81 MG EC tablet  Commonly known as: ECOTRIN  Take 1 tablet (81 mg total) by mouth once daily.     atorvastatin 80 MG tablet  Commonly known as: LIPITOR  Take 1 tablet (80 mg total) by mouth once daily.     diltiaZEM 180 MG 24 hr capsule  Commonly known as: CARDIZEM CD  Take 1 capsule (180 mg total) by mouth once daily.        CONTINUE taking these medications    BIOTIN 100+10 ORAL  Take 1 tablet by mouth Daily.     cetirizine 10 MG tablet  Commonly known as: ZYRTEC  Take 10 mg by mouth once daily.     COMBIGAN 0.2-0.5 % Drop  Generic drug: brimonidine-timoloL  Place 1 drop into the right eye once daily.     ketorolac 0.5% 0.5 % Drop  Commonly known as: ACULAR  Place 1 drop into the right eye Daily.     MULTIVITAMIN-ZINC ASCORBATE ORAL  Take 1 tablet by mouth Daily.     PRESERVISION AREDS ORAL  Take 1 tablet by mouth Daily.         STOP taking these medications    amLODIPine 2.5 MG tablet  Commonly known as: NORVASC            Indwelling Lines/Drains at time of discharge:   Lines/Drains/Airways     None                 Time spent on the discharge of patient: 22 minutes         Gulshan Addison MD  Department of Hospital Medicine  [facility]

## 2022-02-25 NOTE — HPI
66-year-old female with history of hypertension presents with 1 day of left-sided weakness, imbalance and dysarthria found to have an acute CVA.     The patient went to bed without symptoms around midnight.  She awoke in the middle the morning around 530. At this point she recognized that she was having difficulty ambulating due to weakness on the left and difficulty with balance.  When attempting to speak she noticed she had difficulty saying words even though she did have a problem finding words.     At the time of the interview weakness had significantly improved.  Dysarthria had also significantly improved such that she was able to fully communicate.

## 2022-02-28 NOTE — PHYSICIAN QUERY
PT Name: Tameka Hoffman  MR #: [id number]  DOCUMENTATION CLARIFICATION     CDS/:  Vadim Blanton RN, CDS                   Contact Information:  [email or web address]    This form is a permanent document in the medical record.    Query Date: February 28, 2022    By submitting this query, we are merely seeking further clarification of documentation. Please utilize your independent clinical judgment when addressing the question(s) below.    The medical record contains the following:   Indicators  Supporting Clinical Findings Location in Medical Record   X Occlusion or Stenosis documented CTA showed occlusion of distal M2 segment   DCS 2/24   X TIA or Stroke documented Thrombotic stroke involving right middle cerebral artery      Acute ischemic right MCA stroke    Acute CVA of the right MCA    acute right MCA ischemic stroke    Thrombotic stroke involving right middle cerebral artery   VN Consult 2/21    Neuro PN 2/24    HM PN 2/23    DCS 2/24       X NIH Stroke Score (NIHSS)/neurological symptoms/ GCS NIH 6      NIH stroke scale was 3 on my exam.   VN Consult 2/21    DCS 2/24   X Radiology findings Some diffusion positivity is noted along the sylvian fissure on the right along the cortex anteriorly in particular suggestive a region of infarction.  Mild increased FLAIR signal is noted in this region.  The region of diffusion positivity axially appears to span approximately 3 x 3 cm    1. Filling defect in a distal right M2 branch correlating to an area of infarct/ischemia in the right frontal lobe.    Findings suspicious for an early right MCA territory ischemic infarct.  No intracranial hemorrhage   MRI 2/21            CTA 2/21      CT 2/21   X Medication ·Started on Eliquis 5 mg b.i.d..  ·Continue Eliquis and Lipitor for secondary stroke prevention     Treatment     X Other CVA possibly embolic in nature      Etiology of right MCA stroke is likely cardioembolic in the setting of new diagnosis of  AFib.    Patient is found to be in atrial fibrillation with RVR and etiology of stroke is likely cardioembolic in setting of atrial fibrillation    Imaging confirmed that patient had suffered an acute right MCA ischemic stroke.  CTA showed occlusion of distal M2 segment    BOB was performed; showed no septal defect and no intraatrial thrombus. Cardio Consult 2/24    Neuro PN 2/24              DCS 2/24     Provider, please clarify stroke diagnosis associated with above clinical findings.    [ x  ] Thrombotic stroke involving right middle cerebral artery     [   ] Acute ischemic right MCA stroke     [   ] Other Neurological Diagnosis (please specify): _________________     [   ] Clinically Undetermined         Please document in your progress notes daily for the duration of treatment, until resolved, and include in your discharge summary.

## 2022-03-19 ASSESSMENT — VISUAL ACUITY
OD_SC: 20/40
OD_PH: 20/30+1
OS_SC: 20/25-2

## 2022-03-19 ASSESSMENT — TONOMETRY
OD_IOP_MMHG: 12
OS_IOP_MMHG: 15

## 2022-03-23 ENCOUNTER — FOLLOW UP (OUTPATIENT)
Dept: URBAN - METROPOLITAN AREA CLINIC 64 | Facility: CLINIC | Age: 67
End: 2022-03-23

## 2022-03-23 DIAGNOSIS — H35.81: ICD-10-CM

## 2022-03-23 DIAGNOSIS — H43.812: ICD-10-CM

## 2022-03-23 DIAGNOSIS — H35.371: ICD-10-CM

## 2022-03-23 PROCEDURE — 92134 CPTRZ OPH DX IMG PST SGM RTA: CPT

## 2022-03-23 PROCEDURE — 92201 OPSCPY EXTND RTA DRAW UNI/BI: CPT

## 2022-03-23 PROCEDURE — 92014 COMPRE OPH EXAM EST PT 1/>: CPT

## 2022-03-23 ASSESSMENT — TONOMETRY
OS_IOP_MMHG: 15
OD_IOP_MMHG: 16

## 2022-03-23 ASSESSMENT — VISUAL ACUITY
OS_SC: 20/25-1
OD_SC: 20/60-1

## 2022-04-07 ENCOUNTER — OFFICE VISIT (OUTPATIENT)
Dept: CARDIOLOGY | Facility: CLINIC | Age: 67
End: 2022-04-07
Payer: MEDICARE

## 2022-04-07 VITALS
WEIGHT: 220 LBS | BODY MASS INDEX: 37.76 KG/M2 | HEART RATE: 80 BPM | SYSTOLIC BLOOD PRESSURE: 166 MMHG | OXYGEN SATURATION: 96 % | DIASTOLIC BLOOD PRESSURE: 96 MMHG

## 2022-04-07 DIAGNOSIS — I48.0 PAROXYSMAL ATRIAL FIBRILLATION: ICD-10-CM

## 2022-04-07 DIAGNOSIS — E78.5 HYPERLIPIDEMIA LDL GOAL <70: ICD-10-CM

## 2022-04-07 DIAGNOSIS — R42 DIZZY: ICD-10-CM

## 2022-04-07 DIAGNOSIS — I63.311 STROKE DUE TO THROMBOSIS OF RIGHT MIDDLE CEREBRAL ARTERY: ICD-10-CM

## 2022-04-07 DIAGNOSIS — I10 ESSENTIAL HYPERTENSION: Primary | ICD-10-CM

## 2022-04-07 PROCEDURE — 99214 OFFICE O/P EST MOD 30 MIN: CPT | Mod: S$GLB,,, | Performed by: GENERAL PRACTICE

## 2022-04-07 PROCEDURE — 99214 PR OFFICE/OUTPT VISIT, EST, LEVL IV, 30-39 MIN: ICD-10-PCS | Mod: S$GLB,,, | Performed by: GENERAL PRACTICE

## 2022-04-07 RX ORDER — DILTIAZEM HYDROCHLORIDE EXTENDED-RELEASE TABLETS 120 MG/1
120 TABLET, EXTENDED RELEASE ORAL DAILY
Qty: 30 TABLET | Refills: 11 | Status: SHIPPED | OUTPATIENT
Start: 2022-04-07 | End: 2022-04-13 | Stop reason: SDUPTHER

## 2022-04-07 NOTE — PROGRESS NOTES
Subjective:    Patient ID:  Tameka Hoffman is a 66 y.o. female who presents for follow-up of   Chief Complaint   Patient presents with    Hospital Follow Up    Cerebrovascular Accident       HPI:   Tameka Hoffman is a 66 y.o. female with a history of hypertension, presents to the ER for left-sided weakness, gait imbalance which she noticed when she woke up this morning.     Patient states that she went to bed around 12:00 a.m. and she was normal at that time.  Woke up around 5:30 a.m. and noticed that she was weak on left upper and lower extremities and was not able to walk.  She presented to the ER for these symptoms.  In the ER, she had a tele stroke evaluation and had NIH stroke scale was 6. CT head showed early ischemic changes in the right MCA territory.  She was not a candidate for tPA as she was outside treatment window.  CT angiogram head and neck showed a filling defect in the distal right M2 branch.  Patient was not a candidate for intervention per tele stroke due to her distal occlusion.     I have seen the patient in the ER.  On exam, she had a left facial droop, she had a chronic right eye ptosis and mild left-sided weakness.  NIH stroke scale was 3 on my exam.     Patient is being admitted for further stroke workup and management.     She denies any history of stroke in the past, she is compliant with antihypertensive medications.  She is not on aspirin, Plavix or any anticoagulation at home.  She is not a smoker.             Significant Imaging:   Assessment and Plan:   CVA possibly embolic in nature  Spontaneous echo contrast left atrium  DC on Eliquis and Cardizem and off of amlodipine  Follow-up in the office and proximally month will get event monitor      Review of patient's allergies indicates:   Allergen Reactions    Iodinated contrast media Swelling     Complete body swelling and  rash    Poison ivy extract        Past Medical History:   Diagnosis Date    Stroke      History reviewed.  No pertinent surgical history.  Social History     Tobacco Use    Smoking status: Never Smoker    Smokeless tobacco: Never Used   Substance Use Topics    Alcohol use: Never    Drug use: Never     Family History   Problem Relation Age of Onset    Lung cancer Mother     Heart disease Father         Review of Systems:   Constitution: Negative for diaphoresis and fever.   HEENT: Negative for nosebleeds.    Cardiovascular: Negative for chest pain       No dyspnea on exertion       No leg swelling        No palpitations  Respiratory: Negative for shortness of breath and wheezing.    Hematologic/Lymphatic: Negative for bleeding problem. Does not bruise/bleed easily.   Skin: Negative for color change and rash.   Musculoskeletal: Negative for falls and myalgias.   Gastrointestinal: Negative for hematemesis and hematochezia.   Genitourinary: Negative for hematuria.   Neurological: Negative for dizziness and light-headedness.   Psychiatric/Behavioral: Negative for altered mental status and memory loss.          Objective:        Vitals:    04/07/22 1332   BP: (!) 166/96   Pulse:        Lab Results   Component Value Date    WBC 12.24 02/24/2022    HGB 12.5 02/24/2022    HCT 38.9 02/24/2022     02/24/2022    CHOL 182 02/21/2022    TRIG 242 (H) 02/21/2022    HDL 39 (L) 02/21/2022    ALT 24 02/21/2022    AST 21 02/21/2022     02/24/2022    K 4.7 02/24/2022     02/24/2022    CREATININE 1.0 02/24/2022    BUN 22 02/24/2022    CO2 25 02/24/2022    TSH 1.716 02/21/2022    INR 0.9 02/21/2022    HGBA1C 5.6 02/22/2022        ECHOCARDIOGRAM RESULTS  Results for orders placed during the hospital encounter of 02/21/22    Transesophageal echo (BOB) with possible cardioversion    Interpretation Summary  · The left ventricle is normal in size with normal systolic function.  · Normal left ventricular diastolic function.  · The estimated ejection fraction is 65%.  · Atrial fibrillation not observed.  · Normal right  ventricular size with normal right ventricular systolic function.  · Normal appearing left atrial appendage. Normal appendage velocities.  · Normal central venous pressure (3 mmHg).  · Spontaneous echo contrast +2 seen in the left atrium        CURRENT/PREVIOUS VISIT EKG  Results for orders placed or performed during the hospital encounter of 02/21/22   EKG 12-lead    Collection Time: 02/22/22  1:56 AM    Narrative    Test Reason : I48.91,    Vent. Rate : 074 BPM     Atrial Rate : 074 BPM     P-R Int : 156 ms          QRS Dur : 080 ms      QT Int : 422 ms       P-R-T Axes : 057 028 055 degrees     QTc Int : 468 ms    Normal sinus rhythm  Septal infarct (cited on or before 21-FEB-2022)  Abnormal ECG  When compared with ECG of 21-FEB-2022 23:15,  Sinus rhythm has replaced Atrial flutter  Vent. rate has decreased BY  56 BPM  Confirmed by Alexi QUINTERO, Michael SHEFFIELD ([id number]) on 2/22/2022 9:36:45 PM    Referred By: [email or web address]   SELF           Confirmed By:Michael Truong MD     No valid procedures specified.   No results found for this or any previous visit.      Physical Exam:  CONSTITUTIONAL: No fever, no chills  HEENT: Normocephalic, atraumatic,pupils reactive to light                 NECK:  No JVD no carotid bruit  CVS: S1S2+, RRR, no murmurs,   LUNGS: Clear  ABDOMEN: Soft, NT, BS+  EXTREMITIES: No cyanosis, edema  : No rock catheter  NEURO: AAO X 3  PSY: Normal affect      Medication List with Changes/Refills   New Medications    DILTIAZEM (CARDIZEM LA) 120 MG 24 HR TABLET    Take 1 tablet (120 mg total) by mouth once daily.   Current Medications    ATORVASTATIN (LIPITOR) 80 MG TABLET    Take 1 tablet (80 mg total) by mouth once daily.    BIOTIN/CALCIUM CARBONATE (BIOTIN 100+10 ORAL)    Take 1 tablet by mouth Daily.    BRIMONIDINE-TIMOLOL (COMBIGAN) 0.2-0.5 % DROP    Place 1 drop into the right eye once daily.    CETIRIZINE (ZYRTEC) 10 MG TABLET    Take 10 mg by mouth once daily.    KETOROLAC 0.5% (ACULAR) 0.5 % DROP     Place 1 drop into the right eye Daily.    VIT A/VIT C/VIT E/ZINC/COPPER (PRESERVISION AREDS ORAL)    Take 1 tablet by mouth Daily.    ZINC ASCORBATE/MULTIVITAMIN (MULTIVITAMIN-ZINC ASCORBATE ORAL)    Take 1 tablet by mouth Daily.   Changed and/or Refilled Medications    Modified Medication Previous Medication    APIXABAN (ELIQUIS) 5 MG TAB apixaban (ELIQUIS) 5 mg Tab       Take 1 tablet (5 mg total) by mouth 2 (two) times daily.    Take 1 tablet (5 mg total) by mouth 2 (two) times daily.   Discontinued Medications    ASPIRIN (ECOTRIN) 81 MG EC TABLET    Take 1 tablet (81 mg total) by mouth once daily.    DILTIAZEM (CARDIZEM CD) 180 MG 24 HR CAPSULE    Take 1 capsule (180 mg total) by mouth once daily.             Assessment:       1. Essential hypertension    2. Stroke due to thrombosis of right middle cerebral artery    3. Paroxysmal atrial fibrillation    4. Hyperlipidemia LDL goal <70    5. Dizzy         Plan:     Problem List Items Addressed This Visit        Neuro    Stroke due to thrombosis of right middle cerebral artery    Relevant Orders    EKG 12-lead    Cardiac event monitor    Lipid Panel    Comprehensive Metabolic Panel       Cardiac/Vascular    Paroxysmal atrial fibrillation    Relevant Orders    EKG 12-lead    Cardiac event monitor    Lipid Panel    Comprehensive Metabolic Panel    Essential hypertension - Primary    Relevant Orders    Hypertension Digital Medicine (HDMP) Enrollment Order (Completed)    EKG 12-lead    Cardiac event monitor    Lipid Panel    Comprehensive Metabolic Panel      Other Visit Diagnoses     Hyperlipidemia LDL goal <70        Relevant Orders    Lipid Panel    Comprehensive Metabolic Panel    Dizzy              Follow up in about 3 months (around 7/7/2022).    The patients questions were answered, they verbalized understanding, and agreed with the treatment plan.     LAKSHMI CHEN MD  [facility]

## 2022-04-13 ENCOUNTER — PATIENT MESSAGE (OUTPATIENT)
Dept: CARDIOLOGY | Facility: CLINIC | Age: 67
End: 2022-04-13
Payer: MEDICARE

## 2022-04-13 RX ORDER — DILTIAZEM HYDROCHLORIDE 180 MG/1
180 CAPSULE, EXTENDED RELEASE ORAL DAILY
Qty: 30 CAPSULE | Refills: 11 | Status: SHIPPED | OUTPATIENT
Start: 2022-04-13 | End: 2023-04-13

## 2022-04-13 RX ORDER — DILTIAZEM HYDROCHLORIDE EXTENDED-RELEASE TABLETS 120 MG/1
120 TABLET, EXTENDED RELEASE ORAL DAILY
Qty: 90 TABLET | Refills: 3 | Status: SHIPPED | OUTPATIENT
Start: 2022-04-13 | End: 2023-04-13

## 2022-04-13 NOTE — TELEPHONE ENCOUNTER
----- Message from Vamshi Son sent at 4/13/2022 10:28 AM CDT -----  Contact: pt  Type: Needs Medical Advice    Who Called:pt  Best Call Back Number:[phone]    Additional Information: Requesting callback regarding diltiaZEM (CARDIZEM LA) 120 mg 24 hr tablet pt would like to stay on current, pharmacy can't change to a lower dosage, please call back pt to clarify     Please Advise-Thank you

## 2022-05-11 ENCOUNTER — FOLLOW UP (OUTPATIENT)
Dept: URBAN - METROPOLITAN AREA CLINIC 64 | Facility: CLINIC | Age: 67
End: 2022-05-11

## 2022-05-11 DIAGNOSIS — H35.81: ICD-10-CM

## 2022-05-11 DIAGNOSIS — H35.371: ICD-10-CM

## 2022-05-11 DIAGNOSIS — H40.021: ICD-10-CM

## 2022-05-11 PROCEDURE — 92134 CPTRZ OPH DX IMG PST SGM RTA: CPT

## 2022-05-11 PROCEDURE — 92014 COMPRE OPH EXAM EST PT 1/>: CPT

## 2022-05-11 PROCEDURE — 92202 OPSCPY EXTND ON/MAC DRAW: CPT

## 2022-05-11 ASSESSMENT — VISUAL ACUITY
OD_SC: 20/40-1
OS_SC: 20/20

## 2022-05-11 ASSESSMENT — TONOMETRY
OD_IOP_MMHG: 16
OS_IOP_MMHG: 16

## 2022-05-16 ENCOUNTER — TELEPHONE (OUTPATIENT)
Dept: CARDIOLOGY | Facility: CLINIC | Age: 67
End: 2022-05-16
Payer: MEDICARE

## 2022-05-16 NOTE — TELEPHONE ENCOUNTER
Called pt back regarding questions about monitor pt will be wearing it for 7 days but is not required to stay in [place] during the monitor period. The device can be mailed in and a tele med appointment or a call back with results can be set up.  ----- Message from Vamshi oSn sent at 5/16/2022  9:29 AM CDT -----  Contact: pt  Type: Needs Medical Advice    Who Called:pt   Best Call Back Number:[phone]    Additional Information: Requesting callback regarding needing a call back from nurse to go over how long monitor will take over all how long pt needs to be in [place] 3 [place] from now       Please Advise-Thank you

## 2022-05-23 ENCOUNTER — LAB VISIT (OUTPATIENT)
Dept: LAB | Facility: HOSPITAL | Age: 67
End: 2022-05-23
Attending: GENERAL PRACTICE
Payer: MEDICARE

## 2022-05-23 DIAGNOSIS — I63.311 STROKE DUE TO THROMBOSIS OF RIGHT MIDDLE CEREBRAL ARTERY: ICD-10-CM

## 2022-05-23 DIAGNOSIS — I10 ESSENTIAL HYPERTENSION: ICD-10-CM

## 2022-05-23 DIAGNOSIS — E78.5 HYPERLIPIDEMIA LDL GOAL <70: ICD-10-CM

## 2022-05-23 DIAGNOSIS — I48.0 PAROXYSMAL ATRIAL FIBRILLATION: ICD-10-CM

## 2022-05-23 LAB
ALBUMIN SERPL BCP-MCNC: 4 G/DL (ref 3.5–5.2)
ALP SERPL-CCNC: 83 U/L (ref 55–135)
ALT SERPL W/O P-5'-P-CCNC: 20 U/L (ref 10–44)
ANION GAP SERPL CALC-SCNC: 9 MMOL/L (ref 8–16)
AST SERPL-CCNC: 21 U/L (ref 10–40)
BILIRUB SERPL-MCNC: 0.9 MG/DL (ref 0.1–1)
BUN SERPL-MCNC: 16 MG/DL (ref 8–23)
CALCIUM SERPL-MCNC: 9.4 MG/DL (ref 8.7–10.5)
CHLORIDE SERPL-SCNC: 104 MMOL/L (ref 95–110)
CHOLEST SERPL-MCNC: 153 MG/DL (ref 120–199)
CHOLEST/HDLC SERPL: 4.3 {RATIO} (ref 2–5)
CO2 SERPL-SCNC: 25 MMOL/L (ref 23–29)
CREAT SERPL-MCNC: 0.8 MG/DL (ref 0.5–1.4)
EST. GFR  (AFRICAN AMERICAN): >60 ML/MIN/1.73 M^2
EST. GFR  (NON AFRICAN AMERICAN): >60 ML/MIN/1.73 M^2
GLUCOSE SERPL-MCNC: 110 MG/DL (ref 70–110)
HDLC SERPL-MCNC: 36 MG/DL (ref 40–75)
HDLC SERPL: 23.5 % (ref 20–50)
LDLC SERPL CALC-MCNC: 78.8 MG/DL (ref 63–159)
NONHDLC SERPL-MCNC: 117 MG/DL
POTASSIUM SERPL-SCNC: 4.7 MMOL/L (ref 3.5–5.1)
PROT SERPL-MCNC: 7.6 G/DL (ref 6–8.4)
SODIUM SERPL-SCNC: 138 MMOL/L (ref 136–145)
TRIGL SERPL-MCNC: 191 MG/DL (ref 30–150)

## 2022-05-23 PROCEDURE — 36415 COLL VENOUS BLD VENIPUNCTURE: CPT | Performed by: GENERAL PRACTICE

## 2022-05-23 PROCEDURE — 80061 LIPID PANEL: CPT | Performed by: GENERAL PRACTICE

## 2022-05-23 PROCEDURE — 80053 COMPREHEN METABOLIC PANEL: CPT | Performed by: GENERAL PRACTICE

## 2022-05-24 ENCOUNTER — TELEPHONE (OUTPATIENT)
Dept: CARDIOLOGY | Facility: CLINIC | Age: 67
End: 2022-05-24
Payer: MEDICARE

## 2022-05-24 NOTE — TELEPHONE ENCOUNTER
----- Message from Laine Martinez sent at 5/24/2022  7:56 AM CDT -----  Contact: pt  Type: Needs Medical Advice    Who: Called: pt     Best Call Back Number: [phone]    Inquiry/Question: pt will be coming by the office today they could not get the monitor to charge last night and it is dead please call to advise  Thank you~

## 2022-05-30 ENCOUNTER — PATIENT MESSAGE (OUTPATIENT)
Dept: CARDIOLOGY | Facility: CLINIC | Age: 67
End: 2022-05-30
Payer: MEDICARE

## 2022-05-31 ENCOUNTER — TELEPHONE (OUTPATIENT)
Dept: CARDIOLOGY | Facility: CLINIC | Age: 67
End: 2022-05-31

## 2022-05-31 NOTE — TELEPHONE ENCOUNTER
----- Message from Justino King sent at 5/30/2022  9:39 AM CDT -----  Type: Needs Medical Advice  Who Called:  Alexis tejeda/ [employer]    Best Call Back Number: [phone]   Additional Information: Sts Pt met criteria for Atrial fib w/ RDR.  Report is on web site.  Please advise -- Thank you

## 2022-06-08 ENCOUNTER — FOLLOW UP (OUTPATIENT)
Dept: URBAN - METROPOLITAN AREA CLINIC 64 | Facility: CLINIC | Age: 67
End: 2022-06-08

## 2022-06-08 DIAGNOSIS — H35.371: ICD-10-CM

## 2022-06-08 DIAGNOSIS — H35.81: ICD-10-CM

## 2022-06-08 DIAGNOSIS — H40.021: ICD-10-CM

## 2022-06-08 DIAGNOSIS — Z98.890: ICD-10-CM

## 2022-06-08 PROCEDURE — 92014 COMPRE OPH EXAM EST PT 1/>: CPT

## 2022-06-08 PROCEDURE — 92134 CPTRZ OPH DX IMG PST SGM RTA: CPT

## 2022-06-08 PROCEDURE — 92202 OPSCPY EXTND ON/MAC DRAW: CPT

## 2022-06-08 ASSESSMENT — VISUAL ACUITY: OD_SC: 20/40

## 2022-06-08 ASSESSMENT — TONOMETRY: OD_IOP_MMHG: 20

## 2022-06-09 ENCOUNTER — PATIENT MESSAGE (OUTPATIENT)
Dept: CARDIOLOGY | Facility: CLINIC | Age: 67
End: 2022-06-09
Payer: MEDICARE

## 2022-06-09 ENCOUNTER — PATIENT MESSAGE (OUTPATIENT)
Dept: ADMINISTRATIVE | Facility: OTHER | Age: 67
End: 2022-06-09
Payer: MEDICARE

## 2022-07-27 ENCOUNTER — FOLLOW UP (OUTPATIENT)
Dept: URBAN - METROPOLITAN AREA CLINIC 64 | Facility: CLINIC | Age: 67
End: 2022-07-27

## 2022-07-27 DIAGNOSIS — H35.371: ICD-10-CM

## 2022-07-27 DIAGNOSIS — H40.021: ICD-10-CM

## 2022-07-27 DIAGNOSIS — Z98.890: ICD-10-CM

## 2022-07-27 DIAGNOSIS — H35.81: ICD-10-CM

## 2022-07-27 PROCEDURE — 92202 OPSCPY EXTND ON/MAC DRAW: CPT

## 2022-07-27 PROCEDURE — 92134 CPTRZ OPH DX IMG PST SGM RTA: CPT

## 2022-07-27 PROCEDURE — 92014 COMPRE OPH EXAM EST PT 1/>: CPT

## 2022-07-27 ASSESSMENT — TONOMETRY
OS_IOP_MMHG: 12
OD_IOP_MMHG: 11

## 2022-07-27 ASSESSMENT — VISUAL ACUITY
OS_SC: 20/20
OD_SC: 20/40-1

## 2022-11-29 ENCOUNTER — FOLLOW UP (OUTPATIENT)
Dept: URBAN - METROPOLITAN AREA CLINIC 80 | Facility: CLINIC | Age: 67
End: 2022-11-29

## 2022-11-29 DIAGNOSIS — H35.81: ICD-10-CM

## 2022-11-29 DIAGNOSIS — Z98.890: ICD-10-CM

## 2022-11-29 DIAGNOSIS — H40.021: ICD-10-CM

## 2022-11-29 DIAGNOSIS — H35.371: ICD-10-CM

## 2022-11-29 PROCEDURE — 92134 CPTRZ OPH DX IMG PST SGM RTA: CPT

## 2022-11-29 PROCEDURE — 92014 COMPRE OPH EXAM EST PT 1/>: CPT

## 2022-11-29 PROCEDURE — 92202 OPSCPY EXTND ON/MAC DRAW: CPT

## 2022-11-29 ASSESSMENT — VISUAL ACUITY
OS_CC: 20/20-2
OD_CC: 20/30

## 2022-11-29 ASSESSMENT — TONOMETRY
OD_IOP_MMHG: 12
OS_IOP_MMHG: 16

## 2023-03-07 ENCOUNTER — FOLLOW UP (OUTPATIENT)
Dept: URBAN - METROPOLITAN AREA CLINIC 80 | Facility: CLINIC | Age: 68
End: 2023-03-07

## 2023-03-07 DIAGNOSIS — H35.81: ICD-10-CM

## 2023-03-07 DIAGNOSIS — H40.021: ICD-10-CM

## 2023-03-07 DIAGNOSIS — Z98.890: ICD-10-CM

## 2023-03-07 DIAGNOSIS — H35.371: ICD-10-CM

## 2023-03-07 PROCEDURE — 92014 COMPRE OPH EXAM EST PT 1/>: CPT

## 2023-03-07 PROCEDURE — 92134 CPTRZ OPH DX IMG PST SGM RTA: CPT

## 2023-03-07 PROCEDURE — 92202 OPSCPY EXTND ON/MAC DRAW: CPT

## 2023-03-07 ASSESSMENT — TONOMETRY
OD_IOP_MMHG: 17
OS_IOP_MMHG: 17

## 2023-03-07 ASSESSMENT — VISUAL ACUITY
OD_SC: 20/30-2
OS_SC: 20/20-2

## 2023-05-31 ENCOUNTER — FOLLOW UP (OUTPATIENT)
Dept: URBAN - METROPOLITAN AREA CLINIC 64 | Facility: CLINIC | Age: 68
End: 2023-05-31

## 2023-05-31 DIAGNOSIS — H35.371: ICD-10-CM

## 2023-05-31 DIAGNOSIS — H35.81: ICD-10-CM

## 2023-05-31 DIAGNOSIS — H43.812: ICD-10-CM

## 2023-05-31 PROCEDURE — 92202 OPSCPY EXTND ON/MAC DRAW: CPT

## 2023-05-31 PROCEDURE — 92014 COMPRE OPH EXAM EST PT 1/>: CPT

## 2023-05-31 PROCEDURE — 92134 CPTRZ OPH DX IMG PST SGM RTA: CPT

## 2023-05-31 ASSESSMENT — TONOMETRY
OS_IOP_MMHG: 17
OD_IOP_MMHG: 13

## 2023-05-31 ASSESSMENT — VISUAL ACUITY
OS_SC: 20/20
OD_SC: 20/20-2

## 2023-07-12 ENCOUNTER — FOLLOW UP (OUTPATIENT)
Dept: URBAN - METROPOLITAN AREA CLINIC 64 | Facility: CLINIC | Age: 68
End: 2023-07-12

## 2023-07-12 DIAGNOSIS — H40.021: ICD-10-CM

## 2023-07-12 DIAGNOSIS — H35.371: ICD-10-CM

## 2023-07-12 DIAGNOSIS — H43.812: ICD-10-CM

## 2023-07-12 DIAGNOSIS — H35.81: ICD-10-CM

## 2023-07-12 DIAGNOSIS — Z98.890: ICD-10-CM

## 2023-07-12 DIAGNOSIS — Z96.1: ICD-10-CM

## 2023-07-12 PROCEDURE — 92014 COMPRE OPH EXAM EST PT 1/>: CPT

## 2023-07-12 PROCEDURE — 92202 OPSCPY EXTND ON/MAC DRAW: CPT

## 2023-07-12 PROCEDURE — 92134 CPTRZ OPH DX IMG PST SGM RTA: CPT

## 2023-07-12 ASSESSMENT — VISUAL ACUITY
OD_SC: 20/30-2
OS_SC: 20/20

## 2023-07-12 ASSESSMENT — TONOMETRY
OD_IOP_MMHG: 14
OS_IOP_MMHG: 16

## 2023-11-15 ENCOUNTER — FOLLOW UP (OUTPATIENT)
Dept: URBAN - METROPOLITAN AREA CLINIC 64 | Facility: CLINIC | Age: 68
End: 2023-11-15

## 2023-11-15 DIAGNOSIS — Z98.890: ICD-10-CM

## 2023-11-15 DIAGNOSIS — H35.371: ICD-10-CM

## 2023-11-15 DIAGNOSIS — H43.812: ICD-10-CM

## 2023-11-15 DIAGNOSIS — H40.021: ICD-10-CM

## 2023-11-15 DIAGNOSIS — H35.81: ICD-10-CM

## 2023-11-15 PROCEDURE — 92134 CPTRZ OPH DX IMG PST SGM RTA: CPT

## 2023-11-15 PROCEDURE — 92014 COMPRE OPH EXAM EST PT 1/>: CPT

## 2023-11-15 PROCEDURE — 92202 OPSCPY EXTND ON/MAC DRAW: CPT

## 2023-11-15 ASSESSMENT — TONOMETRY
OS_IOP_MMHG: 14
OD_IOP_MMHG: 10

## 2023-11-15 ASSESSMENT — VISUAL ACUITY
OD_SC: 20/40-2
OD_PH: 20/25-2
OS_SC: 20/20

## 2023-12-14 ENCOUNTER — INJECTION ONLY (OUTPATIENT)
Dept: URBAN - METROPOLITAN AREA CLINIC 64 | Facility: CLINIC | Age: 68
End: 2023-12-14

## 2023-12-14 DIAGNOSIS — H35.81: ICD-10-CM

## 2023-12-14 PROCEDURE — 67515 INJECT/TREAT EYE SOCKET: CPT

## 2023-12-14 ASSESSMENT — VISUAL ACUITY: OD_SC: 20/30-1

## 2023-12-14 ASSESSMENT — TONOMETRY: OD_IOP_MMHG: 12

## 2024-01-11 ENCOUNTER — FOLLOW UP (OUTPATIENT)
Dept: URBAN - METROPOLITAN AREA CLINIC 64 | Facility: CLINIC | Age: 69
End: 2024-01-11

## 2024-01-11 DIAGNOSIS — H43.812: ICD-10-CM

## 2024-01-11 DIAGNOSIS — H35.81: ICD-10-CM

## 2024-01-11 DIAGNOSIS — H35.371: ICD-10-CM

## 2024-01-11 PROCEDURE — 92202 OPSCPY EXTND ON/MAC DRAW: CPT

## 2024-01-11 PROCEDURE — 92014 COMPRE OPH EXAM EST PT 1/>: CPT

## 2024-01-11 PROCEDURE — 92134 CPTRZ OPH DX IMG PST SGM RTA: CPT

## 2024-01-11 ASSESSMENT — TONOMETRY
OD_IOP_MMHG: 10
OS_IOP_MMHG: 13

## 2024-01-11 ASSESSMENT — VISUAL ACUITY
OD_SC: 20/40-1
OS_SC: 20/20
OD_PH: 20/25-2

## 2024-03-14 ENCOUNTER — FOLLOW UP (OUTPATIENT)
Dept: URBAN - METROPOLITAN AREA CLINIC 64 | Facility: CLINIC | Age: 69
End: 2024-03-14

## 2024-03-14 DIAGNOSIS — H43.812: ICD-10-CM

## 2024-03-14 DIAGNOSIS — H35.81: ICD-10-CM

## 2024-03-14 DIAGNOSIS — H35.371: ICD-10-CM

## 2024-03-14 PROCEDURE — 92202 OPSCPY EXTND ON/MAC DRAW: CPT

## 2024-03-14 PROCEDURE — 92014 COMPRE OPH EXAM EST PT 1/>: CPT

## 2024-03-14 PROCEDURE — 92134 CPTRZ OPH DX IMG PST SGM RTA: CPT

## 2024-03-14 ASSESSMENT — VISUAL ACUITY
OS_SC: 20/20
OD_SC: 20/30
OD_PH: 20/25

## 2024-03-14 ASSESSMENT — TONOMETRY
OD_IOP_MMHG: 12
OS_IOP_MMHG: 13

## 2024-05-15 ENCOUNTER — FOLLOW UP (OUTPATIENT)
Dept: URBAN - METROPOLITAN AREA CLINIC 64 | Facility: CLINIC | Age: 69
End: 2024-05-15

## 2024-05-15 DIAGNOSIS — H35.81: ICD-10-CM

## 2024-05-15 DIAGNOSIS — H35.371: ICD-10-CM

## 2024-05-15 DIAGNOSIS — H43.812: ICD-10-CM

## 2024-05-15 PROCEDURE — 92014 COMPRE OPH EXAM EST PT 1/>: CPT

## 2024-05-15 PROCEDURE — 92202 OPSCPY EXTND ON/MAC DRAW: CPT

## 2024-05-15 PROCEDURE — 92134 CPTRZ OPH DX IMG PST SGM RTA: CPT

## 2024-05-15 ASSESSMENT — TONOMETRY
OS_IOP_MMHG: 16
OD_IOP_MMHG: 17

## 2024-05-15 ASSESSMENT — VISUAL ACUITY
OS_SC: 20/20-1
OD_SC: 20/30-2

## 2024-07-10 ENCOUNTER — FOLLOW UP (OUTPATIENT)
Dept: URBAN - METROPOLITAN AREA CLINIC 64 | Facility: CLINIC | Age: 69
End: 2024-07-10

## 2024-07-10 DIAGNOSIS — H35.81: ICD-10-CM

## 2024-07-10 DIAGNOSIS — H43.812: ICD-10-CM

## 2024-07-10 DIAGNOSIS — H35.371: ICD-10-CM

## 2024-07-10 PROCEDURE — 92202 OPSCPY EXTND ON/MAC DRAW: CPT

## 2024-07-10 PROCEDURE — 92134 CPTRZ OPH DX IMG PST SGM RTA: CPT

## 2024-07-10 PROCEDURE — 92014 COMPRE OPH EXAM EST PT 1/>: CPT

## 2024-07-10 ASSESSMENT — TONOMETRY
OD_IOP_MMHG: 18
OS_IOP_MMHG: 15

## 2024-07-10 ASSESSMENT — VISUAL ACUITY
OS_SC: 20/20
OD_SC: 20/40

## 2024-10-02 ENCOUNTER — FOLLOW UP (OUTPATIENT)
Dept: URBAN - METROPOLITAN AREA CLINIC 64 | Facility: CLINIC | Age: 69
End: 2024-10-02

## 2024-10-02 DIAGNOSIS — H35.371: ICD-10-CM

## 2024-10-02 DIAGNOSIS — H35.81: ICD-10-CM

## 2024-10-02 DIAGNOSIS — H43.812: ICD-10-CM

## 2024-10-02 PROCEDURE — 92202 OPSCPY EXTND ON/MAC DRAW: CPT

## 2024-10-02 PROCEDURE — 92014 COMPRE OPH EXAM EST PT 1/>: CPT

## 2024-10-02 PROCEDURE — 92134 CPTRZ OPH DX IMG PST SGM RTA: CPT

## 2024-10-02 ASSESSMENT — TONOMETRY
OS_IOP_MMHG: 13
OD_IOP_MMHG: 14

## 2024-10-02 ASSESSMENT — VISUAL ACUITY
OS_SC: 20/20-1
OD_SC: 20/40
OD_PH: 20/25-2

## 2025-03-12 ENCOUNTER — FOLLOW UP (OUTPATIENT)
Dept: URBAN - METROPOLITAN AREA CLINIC 64 | Facility: CLINIC | Age: 70
End: 2025-03-12

## 2025-03-12 DIAGNOSIS — H35.371: ICD-10-CM

## 2025-03-12 DIAGNOSIS — H43.812: ICD-10-CM

## 2025-03-12 DIAGNOSIS — H35.81: ICD-10-CM

## 2025-03-12 PROCEDURE — 99214 OFFICE O/P EST MOD 30 MIN: CPT

## 2025-03-12 PROCEDURE — 92202 OPSCPY EXTND ON/MAC DRAW: CPT

## 2025-03-12 PROCEDURE — 92134 CPTRZ OPH DX IMG PST SGM RTA: CPT

## 2025-03-12 ASSESSMENT — VISUAL ACUITY
OS_SC: 20/25+1
OD_PH: 20/30-2
OD_SC: 20/50-2

## 2025-03-12 ASSESSMENT — TONOMETRY
OS_IOP_MMHG: 13
OD_IOP_MMHG: 10

## 2025-04-02 ENCOUNTER — FOLLOW UP (OUTPATIENT)
Dept: URBAN - METROPOLITAN AREA CLINIC 64 | Facility: CLINIC | Age: 70
End: 2025-04-02

## 2025-04-02 DIAGNOSIS — H35.371: ICD-10-CM

## 2025-04-02 DIAGNOSIS — H35.81: ICD-10-CM

## 2025-04-02 DIAGNOSIS — H43.812: ICD-10-CM

## 2025-04-02 PROCEDURE — 92202 OPSCPY EXTND ON/MAC DRAW: CPT

## 2025-04-02 PROCEDURE — 92134 CPTRZ OPH DX IMG PST SGM RTA: CPT

## 2025-04-02 PROCEDURE — 92014 COMPRE OPH EXAM EST PT 1/>: CPT

## 2025-04-02 ASSESSMENT — VISUAL ACUITY
OD_SC: 20/40
OD_PH: 20/30
OS_SC: 20/20-1

## 2025-04-02 ASSESSMENT — TONOMETRY
OD_IOP_MMHG: 14
OS_IOP_MMHG: 12

## 2025-04-23 ENCOUNTER — FOLLOW UP (OUTPATIENT)
Dept: URBAN - METROPOLITAN AREA CLINIC 64 | Facility: CLINIC | Age: 70
End: 2025-04-23

## 2025-04-23 DIAGNOSIS — H43.812: ICD-10-CM

## 2025-04-23 DIAGNOSIS — H35.371: ICD-10-CM

## 2025-04-23 DIAGNOSIS — H35.81: ICD-10-CM

## 2025-04-23 PROCEDURE — 92014 COMPRE OPH EXAM EST PT 1/>: CPT

## 2025-04-23 PROCEDURE — 92134 CPTRZ OPH DX IMG PST SGM RTA: CPT

## 2025-04-23 PROCEDURE — 92202 OPSCPY EXTND ON/MAC DRAW: CPT

## 2025-04-23 ASSESSMENT — TONOMETRY
OD_IOP_MMHG: 12
OS_IOP_MMHG: 11

## 2025-04-23 ASSESSMENT — VISUAL ACUITY
OD_SC: 20/40
OS_SC: 20/20-2

## 2025-06-18 ENCOUNTER — FOLLOW UP (OUTPATIENT)
Dept: URBAN - METROPOLITAN AREA CLINIC 64 | Facility: CLINIC | Age: 70
End: 2025-06-18

## 2025-06-18 DIAGNOSIS — H35.371: ICD-10-CM

## 2025-06-18 DIAGNOSIS — H35.81: ICD-10-CM

## 2025-06-18 DIAGNOSIS — H43.812: ICD-10-CM

## 2025-06-18 PROCEDURE — 92134 CPTRZ OPH DX IMG PST SGM RTA: CPT

## 2025-06-18 PROCEDURE — 92202 OPSCPY EXTND ON/MAC DRAW: CPT

## 2025-06-18 PROCEDURE — 92014 COMPRE OPH EXAM EST PT 1/>: CPT

## 2025-06-18 ASSESSMENT — TONOMETRY
OS_IOP_MMHG: 12
OD_IOP_MMHG: 10

## 2025-06-18 ASSESSMENT — VISUAL ACUITY
OD_SC: 20/40-1
OS_SC: 20/25+2

## (undated) RX ORDER — PREDNISOLONE ACETATE 10 MG/ML
1 SUSPENSION/ DROPS OPHTHALMIC
Start: 2025-03-12